# Patient Record
Sex: MALE | Race: BLACK OR AFRICAN AMERICAN | Employment: FULL TIME | URBAN - METROPOLITAN AREA
[De-identification: names, ages, dates, MRNs, and addresses within clinical notes are randomized per-mention and may not be internally consistent; named-entity substitution may affect disease eponyms.]

---

## 2020-03-16 ENCOUNTER — HOSPITAL ENCOUNTER (EMERGENCY)
Age: 35
Discharge: HOME | End: 2020-03-16
Attending: EMERGENCY MEDICINE
Payer: MEDICAID

## 2020-03-16 ENCOUNTER — APPOINTMENT (OUTPATIENT)
Dept: GENERAL RADIOLOGY | Age: 35
End: 2020-03-16
Attending: PHYSICIAN ASSISTANT
Payer: MEDICAID

## 2020-03-16 VITALS
RESPIRATION RATE: 16 BRPM | HEART RATE: 97 BPM | DIASTOLIC BLOOD PRESSURE: 87 MMHG | SYSTOLIC BLOOD PRESSURE: 128 MMHG | TEMPERATURE: 98.3 F | OXYGEN SATURATION: 99 % | BODY MASS INDEX: 29.12 KG/M2 | HEIGHT: 72 IN | WEIGHT: 215 LBS

## 2020-03-16 DIAGNOSIS — S16.1XXA STRAIN OF NECK MUSCLE, INITIAL ENCOUNTER: ICD-10-CM

## 2020-03-16 DIAGNOSIS — J20.9 ACUTE BRONCHITIS, UNSPECIFIED ORGANISM: Primary | ICD-10-CM

## 2020-03-16 PROCEDURE — 71046 X-RAY EXAM CHEST 2 VIEWS: CPT

## 2020-03-16 PROCEDURE — 99283 EMERGENCY DEPT VISIT LOW MDM: CPT

## 2020-03-16 RX ORDER — NAPROXEN 500 MG/1
500 TABLET ORAL
Qty: 20 TAB | Refills: 0 | Status: SHIPPED | OUTPATIENT
Start: 2020-03-16 | End: 2020-03-26

## 2020-03-16 RX ORDER — LORATADINE 10 MG/1
10 TABLET ORAL DAILY
Qty: 10 TAB | Refills: 0 | Status: SHIPPED | OUTPATIENT
Start: 2020-03-16 | End: 2022-01-27

## 2020-03-16 RX ORDER — BENZONATATE 100 MG/1
100 CAPSULE ORAL
Qty: 15 CAP | Refills: 0 | Status: SHIPPED | OUTPATIENT
Start: 2020-03-16 | End: 2020-03-21

## 2020-03-16 NOTE — ED TRIAGE NOTES
Pt reports to the ER w/ a dry cough x 1 week and muscular neck and chest pain starting yesterday. Pt reports wrestling w/ his bother last night.

## 2020-03-16 NOTE — DISCHARGE INSTRUCTIONS
Patient Education        Bronchitis: Care Instructions  Your Care Instructions    Bronchitis is inflammation of the bronchial tubes, which carry air to the lungs. The tubes swell and produce mucus, or phlegm. The mucus and inflamed bronchial tubes make you cough. You may have trouble breathing. Most cases of bronchitis are caused by viruses like those that cause colds. Antibiotics usually do not help and they may be harmful. Bronchitis usually develops rapidly and lasts about 2 to 3 weeks in otherwise healthy people. Follow-up care is a key part of your treatment and safety. Be sure to make and go to all appointments, and call your doctor if you are having problems. It's also a good idea to know your test results and keep a list of the medicines you take. How can you care for yourself at home? · Take all medicines exactly as prescribed. Call your doctor if you think you are having a problem with your medicine. · Get some extra rest.  · Take an over-the-counter pain medicine, such as acetaminophen (Tylenol), ibuprofen (Advil, Motrin), or naproxen (Aleve) to reduce fever and relieve body aches. Read and follow all instructions on the label. · Do not take two or more pain medicines at the same time unless the doctor told you to. Many pain medicines have acetaminophen, which is Tylenol. Too much acetaminophen (Tylenol) can be harmful. · Take an over-the-counter cough medicine that contains dextromethorphan to help quiet a dry, hacking cough so that you can sleep. Avoid cough medicines that have more than one active ingredient. Read and follow all instructions on the label. · Breathe moist air from a humidifier, hot shower, or sink filled with hot water. The heat and moisture will thin mucus so you can cough it out. · Do not smoke. Smoking can make bronchitis worse. If you need help quitting, talk to your doctor about stop-smoking programs and medicines.  These can increase your chances of quitting for good.  When should you call for help? Call 911 anytime you think you may need emergency care. For example, call if:    · You have severe trouble breathing.    Call your doctor now or seek immediate medical care if:    · You have new or worse trouble breathing.     · You cough up dark brown or bloody mucus (sputum).     · You have a new or higher fever.     · You have a new rash.    Watch closely for changes in your health, and be sure to contact your doctor if:    · You cough more deeply or more often, especially if you notice more mucus or a change in the color of your mucus.     · You are not getting better as expected. Where can you learn more? Go to http://josie-chris.info/  Enter H333 in the search box to learn more about \"Bronchitis: Care Instructions. \"  Current as of: June 9, 2019Content Version: 12.4  © 6549-0500 [employer]. Care instructions adapted under license by [employer] (which disclaims liability or warranty for this information). If you have questions about a medical condition or this instruction, always ask your healthcare professional. [address] any warranty or liability for your use of this information. Patient Education        Neck Strain: Care Instructions  Your Care Instructions    You have strained the muscles and ligaments in your neck. A sudden, awkward movement can strain the neck. This often occurs with falls or car accidents or during certain sports. Everyday activities like working on a computer or sleeping can also cause neck strain if they force you to hold your neck in an awkward position for a long time. It is common for neck pain to get worse for a day or two after an injury, but it should start to feel better after that. You may have more pain and stiffness for several days before it gets better. This is expected. It may take a few weeks or longer for it to heal completely.  Good home treatment can help you get better faster and avoid future neck problems. Follow-up care is a key part of your treatment and safety. Be sure to make and go to all appointments, and call your doctor if you are having problems. It's also a good idea to know your test results and keep a list of the medicines you take. How can you care for yourself at home? · If you were given a neck brace (cervical collar) to limit neck motion, wear it as instructed for as many days as your doctor tells you to. Do not wear it longer than you were told to. Wearing a brace for too long can make neck stiffness worse and weaken the neck muscles. · You can try using heat or ice to see if it helps. ? Try using a heating pad on a low or medium setting for 15 to 20 minutes every 2 to 3 hours. Try a warm shower in place of one session with the heating pad. You can also buy single-use heat wraps that last up to 8 hours. ? You can also try an ice pack for 10 to 15 minutes every 2 to 3 hours. · Take pain medicines exactly as directed. ? If the doctor gave you a prescription medicine for pain, take it as prescribed. ? If you are not taking a prescription pain medicine, ask your doctor if you can take an over-the-counter medicine. · Gently rub the area to relieve pain and help with blood flow. Do not massage the area if it hurts to do so. · Do not do anything that makes the pain worse. Take it easy for a couple of days. You can do your usual activities if they do not hurt your neck or put it at risk for more stress or injury. · Try sleeping on a special neck pillow. Place it under your neck, not under your head. Placing a tightly rolled-up towel under your neck while you sleep will also work. If you use a neck pillow or rolled towel, do not use your regular pillow at the same time. · To prevent future neck pain, do exercises to stretch and strengthen your neck and back.  Learn how to use good posture, safe lifting techniques, and proper body mechanics. When should you call for help? Call 911 anytime you think you may need emergency care. For example, call if:    · You are unable to move an arm or a leg at all.   [facility] your doctor now or seek immediate medical care if:    · You have new or worse symptoms in your arms, legs, chest, belly, or buttocks. Symptoms may include:  ? Numbness or tingling. ? Weakness. ? Pain.     · You lose bladder or bowel control.    Watch closely for changes in your health, and be sure to contact your doctor if:    · You are not getting better as expected. Where can you learn more? Go to http://josie-chris.info/  Enter M253 in the search box to learn more about \"Neck Strain: Care Instructions. \"  Current as of: June 26, 2019Content Version: 12.4  © 1001-0066 [employer]. Care instructions adapted under license by [employer] (which disclaims liability or warranty for this information). If you have questions about a medical condition or this instruction, always ask your healthcare professional. [address] any warranty or liability for your use of this information.

## 2020-03-16 NOTE — ED NOTES
Pt reports he is an everyday smoker and is c/o dry chronic cough he believes is from smoking, but is also c/o congestion, runny nose. Denies n/v but but does report diarrhea this morning. Denies any travel outside the country or being in contact with anyone who has been traveling. Also, c/o neck and chest pain d/t wrestling with his brother last night. Emergency Department Nursing Plan of Care       The Nursing Plan of Care is developed from the Nursing assessment and Emergency Department Attending provider initial evaluation. The plan of care may be reviewed in the ED Provider note.     The Plan of Care was developed with the following considerations:   Patient / Family readiness to learn indicated by:verbalized understanding  Persons(s) to be included in education: patient  Barriers to Learning/Limitations:No    Signed     Byron Rossi RN    3/16/2020   4:05 PM

## 2020-03-16 NOTE — ED PROVIDER NOTES
EMERGENCY DEPARTMENT HISTORY AND PHYSICAL EXAM    Date: 3/16/2020  Patient Name: Qian Ronquillo    History of Presenting Illness     Chief Complaint   Patient presents with    Muscle Pain    Cough         History Provided By: Patient    HPI: Qian Ronquillo is a 29 y.o. male with a PMH of anxiety and depression who presents with runny noses started today as well as chest pain. Patient states he coughs \"all the time. \"  But states that he got \"nervous\" because he started having nasal congestion today. Patient is a smoker. Patient also reports that he was wrestling with his brother last night so was having some left-sided neck pain with the chest pain. Patient denies any nausea, vomiting, fever or chills. Patient does report some intermittent shortness of breath. Patient rates pain 8 out of 10. Patient has not taken anything for symptoms prior to arrival.  Patient denies any recent travel or known sick contacts. PCP: Charlotte Gilliland MD        Past History     Past Medical History:  Past Medical History:   Diagnosis Date    Anxiety     Depression        Past Surgical History:  History reviewed. No pertinent surgical history. Family History:  History reviewed. No pertinent family history. Social History:  Social History     Tobacco Use    Smoking status: Current Every Day Smoker    Smokeless tobacco: Former User   Substance Use Topics    Alcohol use: Yes     Comment: occasional    Drug use: Not Currently       Allergies:  No Known Allergies      Review of Systems   Review of Systems   Constitutional: Negative for chills and fever. Respiratory: Positive for cough and shortness of breath. Negative for wheezing and stridor. Cardiovascular: Positive for chest pain. Gastrointestinal: Negative for abdominal pain, nausea and vomiting. Musculoskeletal: Positive for myalgias and neck pain. Allergic/Immunologic: Negative for immunocompromised state.    Neurological: Negative for speech difficulty and weakness. All other systems reviewed and are negative. Physical Exam     Vitals:    03/16/20 1557 03/16/20 1606 03/16/20 1655   BP: (!) 136/92  128/87   Pulse: (!) 105 97 97   Resp: 20  16   Temp: 98.3 °F (36.8 °C)     SpO2: 97% 98% 99%   Weight: 97.5 kg (215 lb)     Height: 6' (1.829 m)       Physical Exam  Vitals signs and nursing note reviewed. Constitutional:       General: He is not in acute distress. Appearance: He is well-developed. HENT:      Head: Normocephalic and atraumatic. Mouth/Throat:      Pharynx: No oropharyngeal exudate. Eyes:      Conjunctiva/sclera: Conjunctivae normal.   Neck:      Musculoskeletal: Muscular tenderness ( On left trapezius) present. No spinous process tenderness. Cardiovascular:      Rate and Rhythm: Normal rate and regular rhythm. Heart sounds: Normal heart sounds. Pulmonary:      Effort: Pulmonary effort is normal. No respiratory distress. Breath sounds: Normal breath sounds. No wheezing or rales. Chest:      Chest wall: Tenderness ( Along left upper chest wall) present. Musculoskeletal: Normal range of motion. Skin:     General: Skin is warm and dry. Neurological:      Mental Status: He is alert and oriented to person, place, and time. Diagnostic Study Results     Labs -   No results found for this or any previous visit (from the past 12 hour(s)). Radiologic Studies -   XR CHEST PA LAT   Final Result   IMPRESSION:   1. No radiographic evidence of acute cardiopulmonary disease. CT Results  (Last 48 hours)    None        CXR Results  (Last 48 hours)               03/16/20 1638  XR CHEST PA LAT Final result    Impression:  IMPRESSION:   1. No radiographic evidence of acute cardiopulmonary disease. Narrative:  INDICATION: . cough and SOB   Additional history: Cough and dyspnea   COMPARISON: None. [place] FINDINGS: PA and lateral view of the chest.    .   Lines/tubes/surgical: None. Heart/mediastinum: Unremarkable. Lungs/pleura:  No focal consolidation or mass. No visualized pleural effusion or   pneumothorax. Additional Comments: Dextroscoliosis of the thoracic spine. .               Medical Decision Making   I am the first provider for this patient. I reviewed the vital signs, available nursing notes, past medical history, past surgical history, family history and social history. Vital Signs-Reviewed the patient's vital signs. Disposition:  Discharged    DISCHARGE NOTE:   40 9 PM      Care plan outlined and precautions discussed. Patient has no new complaints, changes, or physical findings. Results of chest x-ray were reviewed with the patient. All medications were reviewed with the patient; will d/c home. All of pt's questions and concerns were addressed. Patient was instructed and agrees to follow up with PCP as needed, as well as to return to the ED upon further deterioration. Patient is ready to go home. Follow-up Information     Follow up With Specialties Details Why Contact Info    Gal Perez MD Family Practice Schedule an appointment as soon as possible for a visit in 1 week As needed Natalie Taylor [address].  [phone]            Discharge Medication List as of 3/16/2020  4:49 PM      START taking these medications    Details   benzonatate (Tessalon Perles) 100 mg capsule Take 1 Cap by mouth three (3) times daily as needed for Cough for up to 5 days. , Normal, Disp-15 Cap, R-0      loratadine (Claritin) 10 mg tablet Take 1 Tab by mouth daily. , Normal, Disp-10 Tab, R-0      naproxen (Naprosyn) 500 mg tablet Take 1 Tab by mouth two (2) times daily as needed for Pain for up to 10 days. , Normal, Disp-20 Tab, R-0             Provider Notes (Medical Decision Making):   DDX: URI, bronchitis, musculoskeletal chest wall pain, muscle strain, muscle spasm.   Will get chest x-ray in light of patient having chest pain with shortness of breath and cough but symptoms likely related to muscle strain from horseplay the night before. Procedures:  Procedures    Please note that this dictation was completed with Dragon, computer voice recognition software. Quite often unanticipated grammatical, syntax, homophones, and other interpretive errors are inadvertently transcribed by the computer software. Please disregard these errors. Additionally, please excuse any errors that have escaped final proofreading. Diagnosis     Clinical Impression:   1. Acute bronchitis, unspecified organism    2.  Strain of neck muscle, initial encounter

## 2020-03-16 NOTE — LETTER
[facility] EMERGENCY DEPT 
[address] [address] 
[phone] Work/School Note Date: 3/16/2020 To Whom It May concern: 
 
Aracely Paulino was seen and treated today in the emergency room by the following provider(s): 
Attending Provider: Nathalie Duran MD 
Physician Assistant: Melani Godfrey PA-C. Aracely Paulino may return to work on 3/18/20. Sincerely, Lyubov Marroquin PA-C

## 2020-03-17 ENCOUNTER — PATIENT OUTREACH (OUTPATIENT)
Dept: CASE MANAGEMENT | Age: 35
End: 2020-03-17

## 2020-03-17 NOTE — PROGRESS NOTES
This writer attempts to reach pt for ED discharge assessment. Today's call goes unanswered, unable to leave a message as mailbox is full. This writer will attempt again within 24 hrs.

## 2020-03-19 ENCOUNTER — PATIENT OUTREACH (OUTPATIENT)
Dept: CASE MANAGEMENT | Age: 35
End: 2020-03-19

## 2020-03-20 ENCOUNTER — PATIENT OUTREACH (OUTPATIENT)
Dept: CASE MANAGEMENT | Age: 35
End: 2020-03-20

## 2020-03-21 ENCOUNTER — HOSPITAL ENCOUNTER (EMERGENCY)
Age: 35
Discharge: HOME | End: 2020-03-21
Attending: EMERGENCY MEDICINE
Payer: MEDICAID

## 2020-03-21 VITALS
WEIGHT: 211 LBS | TEMPERATURE: 98.6 F | HEART RATE: 106 BPM | BODY MASS INDEX: 28.58 KG/M2 | SYSTOLIC BLOOD PRESSURE: 128 MMHG | OXYGEN SATURATION: 99 % | DIASTOLIC BLOOD PRESSURE: 77 MMHG | HEIGHT: 72 IN | RESPIRATION RATE: 16 BRPM

## 2020-03-21 DIAGNOSIS — Z71.1 CONCERN ABOUT SEXUALLY TRANSMITTED DISEASE IN MALE WITHOUT DIAGNOSIS: Primary | ICD-10-CM

## 2020-03-21 LAB
APPEARANCE UR: CLEAR
BACTERIA URNS QL MICRO: NEGATIVE /HPF
BILIRUB UR QL: NEGATIVE
COLOR UR: ABNORMAL
EPITH CASTS URNS QL MICRO: ABNORMAL /LPF
GLUCOSE UR STRIP.AUTO-MCNC: NEGATIVE MG/DL
HGB UR QL STRIP: NEGATIVE
KETONES UR QL STRIP.AUTO: NEGATIVE MG/DL
LEUKOCYTE ESTERASE UR QL STRIP.AUTO: ABNORMAL
NITRITE UR QL STRIP.AUTO: NEGATIVE
PH UR STRIP: 6.5 [PH] (ref 5–8)
PROT UR STRIP-MCNC: NEGATIVE MG/DL
RBC #/AREA URNS HPF: ABNORMAL /HPF (ref 0–5)
SP GR UR REFRACTOMETRY: 1.02 (ref 1–1.03)
UA: UC IF INDICATED,UAUC: ABNORMAL
UROBILINOGEN UR QL STRIP.AUTO: 0.2 EU/DL (ref 0.2–1)
WBC URNS QL MICRO: ABNORMAL /HPF (ref 0–4)

## 2020-03-21 PROCEDURE — 87491 CHLMYD TRACH DNA AMP PROBE: CPT

## 2020-03-21 PROCEDURE — 81001 URINALYSIS AUTO W/SCOPE: CPT

## 2020-03-21 PROCEDURE — 74011000250 HC RX REV CODE- 250: Performed by: NURSE PRACTITIONER

## 2020-03-21 PROCEDURE — 74011250636 HC RX REV CODE- 250/636: Performed by: NURSE PRACTITIONER

## 2020-03-21 PROCEDURE — 96372 THER/PROPH/DIAG INJ SC/IM: CPT

## 2020-03-21 PROCEDURE — 99283 EMERGENCY DEPT VISIT LOW MDM: CPT

## 2020-03-21 PROCEDURE — 74011250637 HC RX REV CODE- 250/637: Performed by: NURSE PRACTITIONER

## 2020-03-21 PROCEDURE — 87086 URINE CULTURE/COLONY COUNT: CPT

## 2020-03-21 RX ORDER — AZITHROMYCIN 500 MG/1
1000 TABLET, FILM COATED ORAL
Status: COMPLETED | OUTPATIENT
Start: 2020-03-21 | End: 2020-03-21

## 2020-03-21 RX ADMIN — AZITHROMYCIN 1000 MG: 500 TABLET, FILM COATED ORAL at 14:44

## 2020-03-21 RX ADMIN — LIDOCAINE HYDROCHLORIDE 250 MG: 10 INJECTION, SOLUTION EPIDURAL; INFILTRATION; INTRACAUDAL; PERINEURAL at 14:45

## 2020-03-21 NOTE — ED NOTES
Patient presents to ED with c/o std check. Patient is alert and oriented x 4 and in no acute distress at this time. Respirations are at a regular rate, depth, and pattern. Patient updated on plan of care and has no questions or concerns at this time. Call bell within reach. Will continue to monitor. Please reference nursing assessment. Emergency Department Nursing Plan of Care       The Nursing Plan of Care is developed from the Nursing assessment and Emergency Department Attending provider initial evaluation. The plan of care may be reviewed in the ED Provider note.     The Plan of Care was developed with the following considerations:   Patient / Family readiness to learn indicated by:verbalized understanding and successful return demonstration  Persons(s) to be included in education: patient  Barriers to Learning/Limitations:No    Signed     Celina Díaz RN    3/21/2020   2:14 PM

## 2020-03-21 NOTE — DISCHARGE INSTRUCTIONS
Patient Education        Exposure to Sexually Transmitted Infections: Care Instructions  Your Care Instructions  Sexually transmitted infections (STIs) are those diseases spread by sexual contact. There are at least 20 different STIs, including chlamydia, gonorrhea, syphilis, and human immunodeficiency virus (HIV), which causes AIDS. Bacteria-caused STIs can be treated and cured. STIs caused by viruses, such as HIV, can be treated but not cured. Some STIs can reduce a woman's chances of getting pregnant in the future. STIs are spread during sexual contact, such as vaginal intercourse and oral or anal sex. Follow-up care is a key part of your treatment and safety. Be sure to make and go to all appointments, and call your doctor if you are having problems. It's also a good idea to know your test results and keep a list of the medicines you take. How can you care for yourself at home? · Your doctor may have given you a shot of antibiotics. If your doctor prescribed antibiotic pills, take them as directed. Do not stop taking them just because you feel better. You need to take the full course of antibiotics. · Do not have sexual contact while you have symptoms of an STI or are being treated for an STI. · Tell your sex partner (or partners) that he or she will need treatment. · If you are a woman, do not douche. Douching changes the normal balance of bacteria in the vagina and may spread an infection up into your reproductive organs. To prevent exposure to STIs in the future  · Use latex condoms every time you have sex. Use them from the beginning to the end of sexual contact. · Talk to your partner before you have sex. Find out if he or she has or is at risk for any STI. Keep in mind that a person may be able to spread an STI even if he or she does not have symptoms. · Do not have sex if you are being treated for an STI.   · Do not have sex with anyone who has symptoms of an STI, such as sores on the genitals or mouth.  · Having one sex partner (who does not have STIs and does not have sex with anyone else) is a good way to avoid STIs. When should you call for help? Call your doctor now or seek immediate medical care if:    · You have new pain in your belly or pelvis.     · You have symptoms of a urinary tract infection. These may include:  ? Pain or burning when you urinate. ? A frequent need to urinate without being able to pass much urine. ? Pain in the flank, which is just below the rib cage and above the waist on either side of the back. ? Blood in your urine. ? A fever.     · You have new or worsening pain or swelling in the scrotum.    Watch closely for changes in your health, and be sure to contact your doctor if:    · You have unusual vaginal bleeding.     · You have a discharge from the vagina or penis.     · You have any new symptoms, such as sores, bumps, rashes, blisters, or warts.     · You have itching, tingling, pain, or burning in the genital or anal area.     · You think you may have an STI. Where can you learn more? Go to http://josie-chris.info/  Enter M049 in the search box to learn more about \"Exposure to Sexually Transmitted Infections: Care Instructions. \"  Current as of: July 7, 2019Content Version: 12.4  © 1733-6806 [employer]. Care instructions adapted under license by [employer] (which disclaims liability or warranty for this information). If you have questions about a medical condition or this instruction, always ask your healthcare professional. [place] [address] any warranty or liability for your use of this information.

## 2020-03-21 NOTE — ED PROVIDER NOTES
EMERGENCY DEPARTMENT HISTORY AND PHYSICAL EXAM    Date: 3/21/2020  Patient Name: Kirk Santiago    History of Presenting Illness     Chief Complaint   Patient presents with    Penile Discharge         History Provided By: Patient    Chief Complaint: penile burining  Duration: 1 Weeks  Timing:  Acute  Location: penis  Quality: Burning  Severity: Moderate  Modifying Factors: none  Associated Symptoms: denies any other associated signs or symptoms      HPI: Kirk Santiago is a 29 y.o. male with a PMH of No significant past medical history who presents with penile burning cute onset 1 week ago. Patient states he is sexually active. Patient states he has concern for sexually transmitted infection. Patient specifically denies penile discharge genital lesions dysuria hematuria fever abdominal pain nausea vomiting. He has no other complaints at this time. PCP: Malorie Luke MD    Current Outpatient Medications   Medication Sig Dispense Refill    benzonatate (Tessalon Perles) 100 mg capsule Take 1 Cap by mouth three (3) times daily as needed for Cough for up to 5 days. 15 Cap 0    loratadine (Claritin) 10 mg tablet Take 1 Tab by mouth daily. 10 Tab 0    naproxen (Naprosyn) 500 mg tablet Take 1 Tab by mouth two (2) times daily as needed for Pain for up to 10 days. 20 Tab 0       Past History     Past Medical History:  Past Medical History:   Diagnosis Date    Anxiety     Depression        Past Surgical History:  History reviewed. No pertinent surgical history. Family History:  History reviewed. No pertinent family history. Social History:  Social History     Tobacco Use    Smoking status: Current Every Day Smoker    Smokeless tobacco: Former User   Substance Use Topics    Alcohol use: Yes     Comment: occasional    Drug use: Not Currently       Allergies:  No Known Allergies      Review of Systems   Review of Systems   Constitutional: Negative for chills, fatigue and fever.    HENT: Negative for congestion and sore throat. Eyes: Negative for redness. Respiratory: Negative for cough, chest tightness and wheezing. Cardiovascular: Negative for chest pain. Gastrointestinal: Negative for abdominal pain. Genitourinary: Negative for dysuria. Penile burining   Musculoskeletal: Negative for arthralgias, back pain, myalgias, neck pain and neck stiffness. Skin: Negative for rash. Neurological: Negative for dizziness, syncope, weakness, light-headedness, numbness and headaches. Hematological: Negative for adenopathy. All other systems reviewed and are negative. Physical Exam     Vitals:    03/21/20 1348   BP: 128/77   Pulse: (!) 106   Resp: 16   Temp: 98.6 °F (37 °C)   SpO2: 99%   Weight: 95.7 kg (211 lb)   Height: 6' (1.829 m)     Physical Exam  Vitals signs and nursing note reviewed. Constitutional:       Appearance: He is well-developed. HENT:      Head: Normocephalic and atraumatic. Right Ear: External ear normal.   Eyes:      General:         Right eye: No discharge. Left eye: No discharge. Conjunctiva/sclera: Conjunctivae normal.   Neck:      Musculoskeletal: Normal range of motion and neck supple. Cardiovascular:      Rate and Rhythm: Normal rate and regular rhythm. Heart sounds: Normal heart sounds. Pulmonary:      Effort: Pulmonary effort is normal. No respiratory distress. Breath sounds: Normal breath sounds. No wheezing. Abdominal:      General: Bowel sounds are normal.      Palpations: Abdomen is soft. Tenderness: There is no abdominal tenderness. Musculoskeletal: Normal range of motion. Lymphadenopathy:      Cervical: No cervical adenopathy. Skin:     General: Skin is warm and dry. Neurological:      Mental Status: He is alert and oriented to person, place, and time. Cranial Nerves: No cranial nerve deficit. Psychiatric:         Behavior: Behavior normal.         Thought Content:  Thought content normal. Judgment: Judgment normal.           Diagnostic Study Results     Labs -     Recent Results (from the past 12 hour(s))   URINALYSIS W/ REFLEX CULTURE    Collection Time: 03/21/20  2:05 PM   Result Value Ref Range    Color YELLOW/STRAW      Appearance CLEAR CLEAR      Specific gravity 1.020 1.003 - 1.030      pH (UA) 6.5 5.0 - 8.0      Protein NEGATIVE  NEG mg/dL    Glucose NEGATIVE  NEG mg/dL    Ketone NEGATIVE  NEG mg/dL    Bilirubin NEGATIVE  NEG      Blood NEGATIVE  NEG      Urobilinogen 0.2 0.2 - 1.0 EU/dL    Nitrites NEGATIVE  NEG      Leukocyte Esterase SMALL (A) NEG      WBC 10-20 0 - 4 /hpf    RBC 0-5 0 - 5 /hpf    Epithelial cells FEW FEW /lpf    Bacteria NEGATIVE  NEG /hpf    UA:UC IF INDICATED URINE CULTURE ORDERED (A) CNI         Radiologic Studies -   No orders to display     CT Results  (Last 48 hours)    None        CXR Results  (Last 48 hours)    None            Medical Decision Making   I am the first provider for this patient. I reviewed the vital signs, available nursing notes, past medical history, past surgical history, family history and social history. Vital Signs-Reviewed the patient's vital signs. Records Reviewed: Nursing Notes            Disposition:  home    DISCHARGE NOTE:           Care plan outlined and precautions discussed. Patient has no new complaints, changes, or physical findings. Results of tests were reviewed with the patient. All medications were reviewed with the patient; will d/c home . All of pt's questions and concerns were addressed. Patient was instructed and agrees to follow up with PCP, as well as to return to the ED upon further deterioration. Patient is ready to go home.     Follow-up Information     Follow up With Specialties Details Why Contact Info    Abad Dupree MD [facility] In 1 week  [address] [facility] [address]  [phone]            Current Discharge Medication List          Provider Notes (Medical Decision Making):   DDX LANA UTI STD  Procedures:  Procedures    Please note that this dictation was completed with Dragon, computer voice recognition software. Quite often unanticipated grammatical, syntax, homophones, and other interpretive errors are inadvertently transcribed by the computer software. Please disregard these errors. Additionally, please excuse any errors that have escaped final proofreading. Diagnosis     Clinical Impression:   1.  Concern about sexually transmitted disease in male without diagnosis

## 2020-03-22 LAB
BACTERIA SPEC CULT: NORMAL
SERVICE CMNT-IMP: NORMAL

## 2020-03-23 LAB
C TRACH DNA SPEC QL NAA+PROBE: NEGATIVE
N GONORRHOEA DNA SPEC QL NAA+PROBE: NEGATIVE
SAMPLE TYPE: NORMAL
SERVICE CMNT-IMP: NORMAL
SPECIMEN SOURCE: NORMAL

## 2020-04-30 ENCOUNTER — HOSPITAL ENCOUNTER (EMERGENCY)
Age: 35
Discharge: HOME | End: 2020-04-30
Attending: EMERGENCY MEDICINE
Payer: MEDICAID

## 2020-04-30 VITALS
WEIGHT: 220 LBS | HEART RATE: 113 BPM | SYSTOLIC BLOOD PRESSURE: 150 MMHG | TEMPERATURE: 97.9 F | BODY MASS INDEX: 29.8 KG/M2 | RESPIRATION RATE: 22 BRPM | HEIGHT: 72 IN | OXYGEN SATURATION: 96 % | DIASTOLIC BLOOD PRESSURE: 82 MMHG

## 2020-04-30 DIAGNOSIS — H10.33 ACUTE BACTERIAL CONJUNCTIVITIS OF BOTH EYES: ICD-10-CM

## 2020-04-30 DIAGNOSIS — R30.0 DYSURIA: Primary | ICD-10-CM

## 2020-04-30 LAB
APPEARANCE UR: CLEAR
BACTERIA URNS QL MICRO: NEGATIVE /HPF
BILIRUB UR QL: NEGATIVE
COLOR UR: NORMAL
EPITH CASTS URNS QL MICRO: NORMAL /LPF
GLUCOSE UR STRIP.AUTO-MCNC: NEGATIVE MG/DL
HGB UR QL STRIP: NEGATIVE
KETONES UR QL STRIP.AUTO: NEGATIVE MG/DL
LEUKOCYTE ESTERASE UR QL STRIP.AUTO: NEGATIVE
NITRITE UR QL STRIP.AUTO: NEGATIVE
PH UR STRIP: 6 [PH] (ref 5–8)
PROT UR STRIP-MCNC: NEGATIVE MG/DL
RBC #/AREA URNS HPF: NORMAL /HPF (ref 0–5)
SP GR UR REFRACTOMETRY: <1.005 (ref 1–1.03)
UA: UC IF INDICATED,UAUC: NORMAL
UROBILINOGEN UR QL STRIP.AUTO: 0.2 EU/DL (ref 0.2–1)
WBC URNS QL MICRO: NORMAL /HPF (ref 0–4)

## 2020-04-30 PROCEDURE — 99283 EMERGENCY DEPT VISIT LOW MDM: CPT

## 2020-04-30 PROCEDURE — 87491 CHLMYD TRACH DNA AMP PROBE: CPT

## 2020-04-30 PROCEDURE — 81001 URINALYSIS AUTO W/SCOPE: CPT

## 2020-04-30 RX ORDER — POLYMYXIN B SULFATE AND TRIMETHOPRIM 1; 10000 MG/ML; [USP'U]/ML
1 SOLUTION OPHTHALMIC EVERY 4 HOURS
Qty: 10 ML | Refills: 0 | Status: SHIPPED | OUTPATIENT
Start: 2020-04-30 | End: 2020-04-30

## 2020-04-30 RX ORDER — POLYMYXIN B SULFATE AND TRIMETHOPRIM 1; 10000 MG/ML; [USP'U]/ML
1 SOLUTION OPHTHALMIC EVERY 4 HOURS
Qty: 10 ML | Refills: 0 | Status: SHIPPED | OUTPATIENT
Start: 2020-04-30 | End: 2022-01-27

## 2020-05-01 ENCOUNTER — PATIENT OUTREACH (OUTPATIENT)
Dept: FAMILY MEDICINE CLINIC | Age: 35
End: 2020-05-01

## 2020-05-01 NOTE — ED NOTES
Patient  given copy of dc instructions and 1 script(s). Patient verbalized understanding of instructions and script (s). Patient given a current medication reconciliation form and verbalized understanding of their medications. Patient verbalized understanding of the importance of discussing medications with  his or her physician or clinic they will be following up with. Patient alert and oriented and in no acute distress. Patient discharged home ambulatory.

## 2020-05-01 NOTE — ED TRIAGE NOTES
Pt reports bilateral eye drainage, crusting, pain, & itching, and penis pain X 3 days. Pt reports friend was diagnosed w/ pink eye. Pt denies concern for STD due to recent check for STDs.

## 2020-05-01 NOTE — ED NOTES
Pt arrived to ED via car with c/o eye pain and pain with urination x 2-3 days. Pt is in no acute distress. Will continue to monitor. See nursing assessment. Safety precautions in place; call light within reach. Emergency Department Nursing Plan of Care       The Nursing Plan of Care is developed from the Nursing assessment and Emergency Department Attending provider initial evaluation. The plan of care may be reviewed in the ED Provider note.     The Plan of Care was developed with the following considerations:   Patient / Family readiness to learn indicated by:verbalized understanding  Persons(s) to be included in education: patient  Barriers to Learning/Limitations:No    Signed     Neto Part, RN    4/30/2020   10:47 PM

## 2020-05-01 NOTE — PROGRESS NOTES
Patient contacted regarding recent discharge and COVID-19 risk   Care Transition Nurse/ Ambulatory Care Manager contacted the patient by telephone to perform post discharge assessment. Verified name and  with patient as identifiers. Patient has following risk factors of: no known risk factors. CTN/ACM reviewed discharge instructions, medical action plan and red flags related to discharge diagnosis. Reviewed and educated them on any new and changed medications related to discharge diagnosis. Advised obtaining a 90-day supply of all daily and as-needed medications. Education provided regarding infection prevention, and signs and symptoms of COVID-19 and when to seek medical attention with patient who verbalized understanding. Discussed exposure protocols and quarantine from [address] you at higher risk for severe illness  and given an opportunity for questions and concerns. The patient agrees to contact the COVID-19 hotline [phone] or PCP office for questions related to their healthcare. CTN/ACM provided contact information for future reference. From CDC: Are you at higher risk for severe illness?  Wash your hands often.  Avoid close contact (6 feet, which is about two arm lengths) with people who are sick.  Put distance between yourself and other people if COVID-19 is spreading in your community.  Clean and disinfect frequently touched surfaces.  Avoid all cruise travel and non-essential air travel.  Call your healthcare professional if you have concerns about COVID-19 and your underlying condition or if you are sick.     For more information on steps you can take to protect yourself, see CDC's How to Protect Yourself      Patient/family/caregiver given information for [facility] and agrees to enroll no  Patient's preferred e-mail:  n/a  Patient's preferred phone number: n/a  Based on Loop alert triggers, patient will be contacted by nurse care manager for worsening symptoms. Plan for follow-up call in 7-14 days based on severity of symptoms and risk factors.

## 2020-05-01 NOTE — ED PROVIDER NOTES
60-year-old male presents with chief complaints of bilateral eye redness, \"crusting,\" \"watering,\" and itching for 3 days. States at times it is affecting his vision. Does not wear glasses or contacts. States that the day before his symptoms began he was with a friend who was diagnosed with pinkeye. He also tells me that he has been experiencing discomfort with urination for 4 days. He denies penile discharge, genital lesions. He denies muscle pain, nausea, vomiting, fever. He states he was seen here previously for this and tested negative for gonorrhea chlamydia, so he does not think it is an STD. Past Medical History:   Diagnosis Date    Anxiety     Depression        No past surgical history on file. No family history on file.     Social History     Socioeconomic History    Marital status: LEGALLY      Spouse name: Not on file    Number of children: Not on file    Years of education: Not on file    Highest education level: Not on file   Occupational History    Not on file   Social Needs    Financial resource strain: Not on file    Food insecurity     Worry: Not on file     Inability: Not on file    Transportation needs     Medical: Not on file     Non-medical: Not on file   Tobacco Use    Smoking status: Current Every Day Smoker    Smokeless tobacco: Former User   Substance and Sexual Activity    Alcohol use: Yes     Comment: occasional    Drug use: Not Currently    Sexual activity: Not on file   Lifestyle    Physical activity     Days per week: Not on file     Minutes per session: Not on file    Stress: Not on file   Relationships    Social connections     Talks on phone: Not on file     Gets together: Not on file     Attends Nondenominational service: Not on file     Active member of club or organization: Not on file     Attends meetings of clubs or organizations: Not on file     Relationship status: Not on file    Intimate partner violence     Fear of current or ex partner: Not on file     Emotionally abused: Not on file     Physically abused: Not on file     Forced sexual activity: Not on file   Other Topics Concern    Not on file   Social History Narrative    Not on file         ALLERGIES: Patient has no known allergies. Review of Systems   Constitutional: Negative. Negative for fever. HENT: Negative. Negative for drooling, facial swelling and trouble swallowing. Eyes: Positive for discharge, redness and itching. Respiratory: Negative. Negative for chest tightness, shortness of breath and wheezing. Cardiovascular: Negative. Negative for chest pain. Gastrointestinal: Negative. Negative for abdominal distention, abdominal pain, constipation, diarrhea, nausea and vomiting. Endocrine: Negative. Genitourinary: Positive for dysuria. Negative for difficulty urinating. Musculoskeletal: Negative. Negative for arthralgias and myalgias. Skin: Negative. Negative for color change and rash. Allergic/Immunologic: Negative. Neurological: Negative. Negative for syncope, facial asymmetry and speech difficulty. Hematological: Negative. Psychiatric/Behavioral: Negative. Negative for agitation and confusion. All other systems reviewed and are negative. Vitals:    04/30/20 2129   BP: 150/82   Pulse: (!) 113   Resp: 22   Temp: 97.9 °F (36.6 °C)   SpO2: 96%   Weight: 99.8 kg (220 lb)   Height: 6' (1.829 m)            Physical Exam  Vitals signs and nursing note reviewed. Constitutional:       Appearance: Normal appearance. He is well-developed. HENT:      Head: Normocephalic and atraumatic. Eyes:      General: Lids are normal.         Right eye: Discharge present. Left eye: Discharge present. Extraocular Movements:      Right eye: Normal extraocular motion. Left eye: Normal extraocular motion. Conjunctiva/sclera:      Right eye: Right conjunctiva is injected. Left eye: Left conjunctiva is injected.    Neck: Musculoskeletal: Neck supple. Cardiovascular:      Rate and Rhythm: Normal rate and regular rhythm. Pulmonary:      Effort: No accessory muscle usage or respiratory distress. Abdominal:      Palpations: Abdomen is soft. Tenderness: There is no abdominal tenderness. Musculoskeletal: Normal range of motion. Skin:     General: Skin is warm and dry. Neurological:      Mental Status: He is alert and oriented to person, place, and time. Psychiatric:         Behavior: Behavior normal.         Thought Content: Thought content normal.          MDM  Number of Diagnoses or Management Options  Acute bacterial conjunctivitis of both eyes:   Dysuria:   Diagnosis management comments: Given his known contact with patient with david, eye symptoms are likely conjunctivitis. May require treatment for urethritis with outpatient doxycycline instead of just one-time dose of ceftriaxone in ED azithromycin as he got on his last visit. I counseled him that I pain and urinary pain could be indicative of arthritic diseases, though this is less likely because of his known pink eye contact.   Counseled him to follow-up with primary care if symptoms persist         Procedures      LABORATORY TESTS:  Recent Results (from the past 12 hour(s))   URINALYSIS W/ REFLEX CULTURE    Collection Time: 04/30/20  9:56 PM   Result Value Ref Range    Color YELLOW/STRAW      Appearance CLEAR CLEAR      Specific gravity <1.005 1.003 - 1.030    pH (UA) 6.0 5.0 - 8.0      Protein Negative NEG mg/dL    Glucose Negative NEG mg/dL    Ketone Negative NEG mg/dL    Bilirubin Negative NEG      Blood Negative NEG      Urobilinogen 0.2 0.2 - 1.0 EU/dL    Nitrites Negative NEG      Leukocyte Esterase Negative NEG      WBC 0-4 0 - 4 /hpf    RBC 0-5 0 - 5 /hpf    Epithelial cells FEW FEW /lpf    Bacteria Negative NEG /hpf    UA:UC IF INDICATED CULTURE NOT INDICATED BY UA RESULT CNI         IMAGING RESULTS:  No orders to display       MEDICATIONS GIVEN:  Medications - No data to display    IMPRESSION:  1. Dysuria    2. Acute bacterial conjunctivitis of both eyes        PLAN:  1. Discharge Medication List as of 4/30/2020 10:31 PM      START taking these medications    Details   trimethoprim-polymyxin b (POLYTRIM) ophthalmic solution Administer 1 Drop to both eyes every four (4) hours. Indications: pink eye from bacterial infection, Print, Disp-10 mL, R-0         CONTINUE these medications which have NOT CHANGED    Details   loratadine (Claritin) 10 mg tablet Take 1 Tab by mouth daily. , Normal, Disp-10 Tab, R-0           2.    Follow-up Information    None       Return to ED if worse

## 2020-05-01 NOTE — DISCHARGE INSTRUCTIONS
Patient Education        Patient Education        Pinkeye: Care Instructions  Your Care Instructions    Pinkeye is redness and swelling of the eye surface and the conjunctiva (the lining of the eyelid and the covering of the white part of the eye). Pinkeye is also called conjunctivitis. Pinkeye is often caused by infection with bacteria or a virus. Dry air, allergies, smoke, and chemicals are other common causes. Pinkeye often clears on its own in 7 to 10 days. Antibiotics only help if the pinkeye is caused by bacteria. Pinkeye caused by infection spreads easily. If an allergy or chemical is causing pinkeye, it will not go away unless you can avoid whatever is causing it. Follow-up care is a key part of your treatment and safety. Be sure to make and go to all appointments, and call your doctor if you are having problems. It's also a good idea to know your test results and keep a list of the medicines you take. How can you care for yourself at home? · Wash your hands often. Always wash them before and after you treat pinkeye or touch your eyes or face. · Use moist cotton or a clean, wet cloth to remove crust. Wipe from the inside corner of the eye to the outside. Use a clean part of the cloth for each wipe. · Put cold or warm wet cloths on your eye a few times a day if the eye hurts. · Do not wear contact lenses or eye makeup until the pinkeye is gone. Throw away any eye makeup you were using when you got pinkeye. Clean your contacts and storage case. If you wear disposable contacts, use a new pair when your eye has cleared and it is safe to wear contacts again. · If the doctor gave you antibiotic ointment or eyedrops, use them as directed. Use the medicine for as long as instructed, even if your eye starts looking better soon. Keep the bottle tip clean, and do not let it touch the eye area. · To put in eyedrops or ointment:  ? Tilt your head back, and pull your lower eyelid down with one finger. ?  Drop or squirt the medicine inside the lower lid. ? Close your eye for 30 to 60 seconds to let the drops or ointment move around. ? Do not touch the ointment or dropper tip to your eyelashes or any other surface. · Do not share towels, pillows, or washcloths while you have pinkeye. When should you call for help? Call your doctor now or seek immediate medical care if:    · You have pain in your eye, not just irritation on the surface.     · You have a change in vision or loss of vision.     · You have an increase in discharge from the eye.     · Your eye has not started to improve or begins to get worse within 48 hours after you start using antibiotics.     · Pinkeye lasts longer than 7 days.    Watch closely for changes in your health, and be sure to contact your doctor if you have any problems. Where can you learn more? Go to http://josie-chris.info/  Enter Y392 in the search box to learn more about \"Pinkeye: Care Instructions. \"  Current as of: June 26, 2019Content Version: 12.4  © 9557-6256 [employer]. Care instructions adapted under license by [employer] (which disclaims liability or warranty for this information). If you have questions about a medical condition or this instruction, always ask your healthcare professional. [place] [address] any warranty or liability for your use of this information. Painful Urination (Dysuria): Care Instructions  Your Care Instructions  Burning pain with urination (dysuria) is a common symptom of a urinary tract infection or other urinary problems. The bladder may become inflamed. This can cause pain when the bladder fills and empties. You may also feel pain if the tube that carries urine from the bladder to the outside of the body (urethra) gets irritated or infected. Sexually transmitted infections (STIs) also may cause pain when you urinate.   Sometimes the pain can be caused by things other than an infection. The urethra can be irritated by soaps, perfumes, or foreign objects in the urethra. Kidney stones can cause pain when they pass through the urethra. The cause may be hard to find. You may need tests. Treatment for painful urination depends on the cause. Follow-up care is a key part of your treatment and safety. Be sure to make and go to all appointments, and call your doctor if you are having problems. It's also a good idea to know your test results and keep a list of the medicines you take. How can you care for yourself at home? · Drink extra water for the next day or two. This will help make the urine less concentrated. (If you have kidney, heart, or liver disease and have to limit fluids, talk with your doctor before you increase the amount of fluids you drink.)  · Avoid drinks that are carbonated or have caffeine. They can irritate the bladder. · Urinate often. Try to empty your bladder each time. For women:  · Urinate right after you have sex. · After going to the bathroom, wipe from front to back. · Avoid douches, bubble baths, and feminine hygiene sprays. And avoid other feminine hygiene products that have deodorants. When should you call for help? Call your doctor now or seek immediate medical care if:    · You have new symptoms, such as fever, nausea, or vomiting.     · You have new or worse symptoms of a urinary problem. For example:  ? You have blood or pus in your urine. ? You have chills or body aches. ? It hurts worse to urinate. ? You have groin or belly pain. ? You have pain in your back just below your rib cage (the flank area).    Watch closely for changes in your health, and be sure to contact your doctor if you have any problems. Where can you learn more? Go to http://josie-chris.info/  Enter H814 in the search box to learn more about \"Painful Urination (Dysuria): Care Instructions. \"  Current as of: August 21, 2019Content Version: 12.4  © 7768-2416 [employer]. Care instructions adapted under license by [employer] (which disclaims liability or warranty for this information). If you have questions about a medical condition or this instruction, always ask your healthcare professional. [address] any warranty or liability for your use of this information.

## 2020-05-20 ENCOUNTER — PATIENT OUTREACH (OUTPATIENT)
Dept: FAMILY MEDICINE CLINIC | Age: 35
End: 2020-05-20

## 2020-05-20 NOTE — PROGRESS NOTES
Patient resolved from Transition of Care episode on 5/20/20  Patient/family has been provided the following resources and education related to COVID-19:                         Signs, symptoms and red flags related to COVID-19            CDC exposure and quarantine guidelines            [facility]it exposure contact - [phone]            Contact for their local Department of Health                 Patient currently reports that the following symptoms have improved:  no new symptoms. No further outreach scheduled with this CTN/ACM. Episode of Care resolved. Patient has this CTN/ACM contact information if future needs arise.

## 2020-07-08 ENCOUNTER — VIRTUAL VISIT (OUTPATIENT)
Dept: FAMILY MEDICINE CLINIC | Age: 35
End: 2020-07-08

## 2020-07-08 DIAGNOSIS — E78.2 MIXED HYPERLIPIDEMIA: Primary | ICD-10-CM

## 2020-07-08 DIAGNOSIS — Z71.89 ADVICE GIVEN ABOUT COVID-19 VIRUS INFECTION: ICD-10-CM

## 2020-07-08 DIAGNOSIS — R53.83 FATIGUE DUE TO DEPRESSION: ICD-10-CM

## 2020-07-08 DIAGNOSIS — Z83.3 FAMILY HISTORY OF DIABETES MELLITUS (DM): ICD-10-CM

## 2020-07-08 DIAGNOSIS — F33.9 RECURRENT DEPRESSIVE DISORDER (HCC): ICD-10-CM

## 2020-07-08 DIAGNOSIS — F32.A FATIGUE DUE TO DEPRESSION: ICD-10-CM

## 2020-07-08 RX ORDER — HYDROXYZINE 50 MG/1
50 TABLET, FILM COATED ORAL DAILY
Qty: 30 TAB | Refills: 1 | Status: SHIPPED | OUTPATIENT
Start: 2020-07-08 | End: 2021-01-11 | Stop reason: SDUPTHER

## 2020-07-08 RX ORDER — SIMVASTATIN 20 MG/1
20 TABLET, FILM COATED ORAL
COMMUNITY
End: 2020-07-08 | Stop reason: SDUPTHER

## 2020-07-08 RX ORDER — SIMVASTATIN 20 MG/1
20 TABLET, FILM COATED ORAL
Qty: 30 TAB | Refills: 3 | Status: SHIPPED | OUTPATIENT
Start: 2020-07-08 | End: 2021-01-11 | Stop reason: SDUPTHER

## 2020-07-08 RX ORDER — HYDROXYZINE 50 MG/1
50 TABLET, FILM COATED ORAL DAILY
COMMUNITY
End: 2020-07-08 | Stop reason: SDUPTHER

## 2020-07-08 NOTE — PATIENT INSTRUCTIONS

## 2020-07-08 NOTE — PROGRESS NOTES
Chief Complaint   Patient presents with   [facility]     1. Have you been to the ER, urgent care clinic since your last visit? Hospitalized since your last visit? Yes When: 4/30/ Where: [facility] Reason for visit: eye drainage, cough    2. Have you seen or consulted any other health care providers outside of the [address] [place] [address] since your last visit? Include any pap smears or colon screening. No    Call placed to pt. Verified patient with two type of identifiers.  no previous PCP

## 2020-07-08 NOTE — PROGRESS NOTES
HISTORY OF PRESENT ILLNESS  Rob Donato is a 29 y.o. male. HPI  Today's Pt main concerns were provided on virtual visit and Video telemed format,  Present on VV for the concern of the current medical conditions,  pt is w/ comorbid history and unaware if the pt has been exposed to covid-19 individual,   Pt Have been staying at home for couple of wks,  pt has no fever no cough no dyspnea, no ha, not dizzy, nl smell nl taste, no N/V/D, no body ache. Unfortunately  with tobacco abuse stating that the pt is all stressed out, the tobacco use helps it, took meds for it, no s no h ideation, the pt has slowed down but not ready to quit at this time unable to afford the medication the pt likes to do it on its own, pt has been offered alternatives but states not yet, +socially Etoh  In addition patient has history of hypercholesterolemia has been compliant with the statin therapy denies any muscle ache, currently taking the medication nightly last lipid panel was reviewed and was normal ++++refills needed at this time has been trying to have a low-fat low-cholesterol diet sometimes likes the fast foods weight has been stable    Patient's current medical condition is not controlled with medications,   Patient state that it is getting better: pt states and reports of feeling anxius, some guilty feeling, no Hoplessness, patient at this time has ns/nh,ni,nh, and some trouble with weight gain, last ability of sleep, okay ablitiy  to concentrate at work(patient is able to work 8-12 hrs per week at this time) and at home with the current medications, and all together a safe feeling at home and at work since the age of 15 yo, was incarcerated, finished school while incarcerated, do not want to be medicated,          Current Outpatient Medications   Medication Sig Dispense Refill    hydrOXYzine HCL (ATARAX) 50 mg tablet Take 1 Tab by mouth daily.  Indications: anxious 30 Tab 1    simvastatin (ZOCOR) 20 mg tablet Take 1 Tab by mouth nightly. 30 Tab 3    trimethoprim-polymyxin b (POLYTRIM) ophthalmic solution Administer 1 Drop to both eyes every four (4) hours. Indications: pink eye from bacterial infection 10 mL 0    loratadine (Claritin) 10 mg tablet Take 1 Tab by mouth daily. 10 Tab 0     No Known Allergies  Past Medical History:   Diagnosis Date    Anxiety     Anxiety     Depression     Hypercholesterolemia      History reviewed. No pertinent surgical history. Family History   Problem Relation Age of Onset    Hypertension Mother     No Known Problems Father      Social History     Tobacco Use    Smoking status: Current Every Day Smoker     Packs/day: 0.50    Smokeless tobacco: Never Used   Substance Use Topics    Alcohol use: Yes     Alcohol/week: 3.0 standard drinks     Types: 3 Cans of beer per week      No results found for: HBA1C, [id number], HGBE8, GLU, GESTF, GLUCPOC, MCACR, MCA1, MCA2, MCA3, MCAU, LDL, LDLC, DLDLP, AJAY, CREAPOC, ACREA, CREA, REFC3, REFC4, ZEE0DXAN   No results found for: CHOL, CHOLPOCT, HDL, LDL, LDLC, LDLCPOC, LDLCEXT, TRIGL, TGLPOCT, CHHD, CHHDX     Review of Systems   Constitutional: Negative for chills and fever. HENT: Negative for congestion and nosebleeds. Eyes: Negative for blurred vision and pain. Respiratory: Negative for cough, shortness of breath and wheezing. Cardiovascular: Negative for chest pain and leg swelling. Gastrointestinal: Negative for constipation, diarrhea, nausea and vomiting. Genitourinary: Negative for dysuria and frequency. Musculoskeletal: Negative for joint pain and myalgias. Skin: Negative for itching and rash. Neurological: Negative for dizziness, loss of consciousness and headaches. Psychiatric/Behavioral: Negative for depression. The patient is not nervous/anxious and does not have insomnia. Physical Exam  Constitutional:       Appearance: Normal appearance. HENT:      Head: Normocephalic and atraumatic.       Nose: Nose normal. No congestion. Neurological:      Mental Status: He is alert and oriented to person, place, and time. Psychiatric:         Mood and Affect: Mood is anxious and depressed. Mood is not elated. Affect is not labile, blunt, flat, angry, tearful or inappropriate. Speech: He is communicative. Speech is not rapid and pressured or delayed. Behavior: Behavior is slowed. Behavior is not agitated, aggressive or withdrawn. Thought Content: Thought content normal. Thought content is not paranoid. Thought content does not include suicidal ideation. Cognition and Memory: Cognition and memory normal.         Judgment: Judgment normal. Judgment is not impulsive or inappropriate. ASSESSMENT and PLAN  Diagnoses and all orders for this visit:    1. Mixed hyperlipidemia  -     TSH 3RD GENERATION  -     METABOLIC PANEL, COMPREHENSIVE  -     LIPID PANEL  -     HEMOGLOBIN A1C WITH EAG  -     CBC WITH AUTOMATED DIFF  -     URINALYSIS W/ RFLX MICROSCOPIC  -     hydrOXYzine HCL (ATARAX) 50 mg tablet; Take 1 Tab by mouth daily. Indications: anxious  -     simvastatin (ZOCOR) 20 mg tablet; Take 1 Tab by mouth nightly. 2. Advice given about COVID-19 virus infection  -     TSH 3RD GENERATION  -     METABOLIC PANEL, COMPREHENSIVE  -     LIPID PANEL  -     HEMOGLOBIN A1C WITH EAG  -     CBC WITH AUTOMATED DIFF  -     URINALYSIS W/ RFLX MICROSCOPIC  -     hydrOXYzine HCL (ATARAX) 50 mg tablet; Take 1 Tab by mouth daily. Indications: anxious  -     simvastatin (ZOCOR) 20 mg tablet; Take 1 Tab by mouth nightly. 3. Fatigue due to depression  -     TSH 3RD GENERATION  -     METABOLIC PANEL, COMPREHENSIVE  -     LIPID PANEL  -     HEMOGLOBIN A1C WITH EAG  -     CBC WITH AUTOMATED DIFF  -     URINALYSIS W/ RFLX MICROSCOPIC  -     hydrOXYzine HCL (ATARAX) 50 mg tablet; Take 1 Tab by mouth daily. Indications: anxious  -     simvastatin (ZOCOR) 20 mg tablet; Take 1 Tab by mouth nightly.     4. Recurrent [facility] ([facility]  -     TSH 3RD GENERATION  -     METABOLIC PANEL, COMPREHENSIVE  -     LIPID PANEL  -     HEMOGLOBIN A1C WITH EAG  -     CBC WITH AUTOMATED DIFF  -     URINALYSIS W/ RFLX MICROSCOPIC  -     hydrOXYzine HCL (ATARAX) 50 mg tablet; Take 1 Tab by mouth daily. Indications: anxious  -     simvastatin (ZOCOR) 20 mg tablet; Take 1 Tab by mouth nightly. 5. Family history of diabetes mellitus (DM)  -     HEMOGLOBIN A1C WITH EAG          Wt tobacco abuse not at target goal, the appropriate diet discussed. lifelong compliance to reduce risk is stressed. A regular exercise program,  maintain normal body weight, fitness,  to avoid fast food Advised, recheck for flp and lft in 3 months rtc fasting, meds side effect and compliance advised. Concern abdout COVID-19 addressed and detailed, pt was told that the best way to prevent illness is by protection, to Wear a facemask , having social distance, and to get tested if possible, Pursuant to the emergency declaration under the [address] and [facility] waiver authority and the [employer] Act, this Virtual Visit was conducted, with patient's consent, to reduce the patient's risk of exposure to COVID-19 and provide continuity of care for an established patient. Pt was also told if develop dyspnea needs to call 911 or go to er, call for karime advise, pt agreed with todays recommendations, Services were provided through a Video synchronous discussion virtually to substitute for in-person appointment.

## 2020-08-31 ENCOUNTER — TELEPHONE (OUTPATIENT)
Dept: FAMILY MEDICINE CLINIC | Age: 35
End: 2020-08-31

## 2020-12-15 ENCOUNTER — HOSPITAL ENCOUNTER (EMERGENCY)
Age: 35
Discharge: HOME | End: 2020-12-15
Attending: EMERGENCY MEDICINE
Payer: MEDICAID

## 2020-12-15 ENCOUNTER — APPOINTMENT (OUTPATIENT)
Dept: CT IMAGING | Age: 35
End: 2020-12-15
Attending: EMERGENCY MEDICINE
Payer: MEDICAID

## 2020-12-15 ENCOUNTER — APPOINTMENT (OUTPATIENT)
Dept: GENERAL RADIOLOGY | Age: 35
End: 2020-12-15
Attending: EMERGENCY MEDICINE
Payer: MEDICAID

## 2020-12-15 VITALS
WEIGHT: 220 LBS | RESPIRATION RATE: 16 BRPM | HEIGHT: 72 IN | DIASTOLIC BLOOD PRESSURE: 85 MMHG | OXYGEN SATURATION: 96 % | TEMPERATURE: 98.2 F | SYSTOLIC BLOOD PRESSURE: 127 MMHG | HEART RATE: 90 BPM | BODY MASS INDEX: 29.8 KG/M2

## 2020-12-15 DIAGNOSIS — R55 SYNCOPE, UNSPECIFIED SYNCOPE TYPE: ICD-10-CM

## 2020-12-15 DIAGNOSIS — R42 DIZZINESS: Primary | ICD-10-CM

## 2020-12-15 LAB
ALBUMIN SERPL-MCNC: 3.6 G/DL (ref 3.5–5)
ALBUMIN/GLOB SERPL: 0.9 {RATIO} (ref 1.1–2.2)
ALP SERPL-CCNC: 86 U/L (ref 45–117)
ALT SERPL-CCNC: 30 U/L (ref 12–78)
ANION GAP SERPL CALC-SCNC: 4 MMOL/L (ref 5–15)
AST SERPL-CCNC: 18 U/L (ref 15–37)
BASOPHILS # BLD: 0 K/UL (ref 0–0.1)
BASOPHILS NFR BLD: 0 % (ref 0–1)
BILIRUB SERPL-MCNC: 0.5 MG/DL (ref 0.2–1)
BUN SERPL-MCNC: 11 MG/DL (ref 6–20)
BUN/CREAT SERPL: 10 (ref 12–20)
CALCIUM SERPL-MCNC: 9.5 MG/DL (ref 8.5–10.1)
CHLORIDE SERPL-SCNC: 104 MMOL/L (ref 97–108)
CO2 SERPL-SCNC: 28 MMOL/L (ref 21–32)
CREAT SERPL-MCNC: 1.13 MG/DL (ref 0.7–1.3)
DIFFERENTIAL METHOD BLD: ABNORMAL
EOSINOPHIL # BLD: 0.1 K/UL (ref 0–0.4)
EOSINOPHIL NFR BLD: 2 % (ref 0–7)
ERYTHROCYTE [DISTWIDTH] IN BLOOD BY AUTOMATED COUNT: 12.7 % (ref 11.5–14.5)
GLOBULIN SER CALC-MCNC: 3.9 G/DL (ref 2–4)
GLUCOSE SERPL-MCNC: 92 MG/DL (ref 65–100)
HCT VFR BLD AUTO: 41.6 % (ref 36.6–50.3)
HGB BLD-MCNC: 13.7 G/DL (ref 12.1–17)
IMM GRANULOCYTES # BLD AUTO: 0 K/UL (ref 0–0.04)
IMM GRANULOCYTES NFR BLD AUTO: 0 % (ref 0–0.5)
LYMPHOCYTES # BLD: 1.6 K/UL (ref 0.8–3.5)
LYMPHOCYTES NFR BLD: 23 % (ref 12–49)
MCH RBC QN AUTO: 29.1 PG (ref 26–34)
MCHC RBC AUTO-ENTMCNC: 32.9 G/DL (ref 30–36.5)
MCV RBC AUTO: 88.3 FL (ref 80–99)
MONOCYTES # BLD: 0.6 K/UL (ref 0–1)
MONOCYTES NFR BLD: 8 % (ref 5–13)
NEUTS SEG # BLD: 4.7 K/UL (ref 1.8–8)
NEUTS SEG NFR BLD: 67 % (ref 32–75)
NRBC # BLD: 0 K/UL (ref 0–0.01)
NRBC BLD-RTO: 0 PER 100 WBC
PLATELET # BLD AUTO: 357 K/UL (ref 150–400)
PMV BLD AUTO: 8.6 FL (ref 8.9–12.9)
POTASSIUM SERPL-SCNC: 4.2 MMOL/L (ref 3.5–5.1)
PROT SERPL-MCNC: 7.5 G/DL (ref 6.4–8.2)
RBC # BLD AUTO: 4.71 M/UL (ref 4.1–5.7)
SODIUM SERPL-SCNC: 136 MMOL/L (ref 136–145)
TROPONIN I SERPL-MCNC: <0.05 NG/ML
WBC # BLD AUTO: 7 K/UL (ref 4.1–11.1)

## 2020-12-15 PROCEDURE — 96360 HYDRATION IV INFUSION INIT: CPT

## 2020-12-15 PROCEDURE — 85025 COMPLETE CBC W/AUTO DIFF WBC: CPT

## 2020-12-15 PROCEDURE — 36415 COLL VENOUS BLD VENIPUNCTURE: CPT

## 2020-12-15 PROCEDURE — 70450 CT HEAD/BRAIN W/O DYE: CPT

## 2020-12-15 PROCEDURE — 74011250636 HC RX REV CODE- 250/636: Performed by: EMERGENCY MEDICINE

## 2020-12-15 PROCEDURE — 80053 COMPREHEN METABOLIC PANEL: CPT

## 2020-12-15 PROCEDURE — 71045 X-RAY EXAM CHEST 1 VIEW: CPT

## 2020-12-15 PROCEDURE — 84484 ASSAY OF TROPONIN QUANT: CPT

## 2020-12-15 PROCEDURE — 99283 EMERGENCY DEPT VISIT LOW MDM: CPT

## 2020-12-15 PROCEDURE — 93005 ELECTROCARDIOGRAM TRACING: CPT

## 2020-12-15 RX ORDER — LORAZEPAM 1 MG/1
0.5 TABLET ORAL
Status: DISCONTINUED | OUTPATIENT
Start: 2020-12-15 | End: 2020-12-15 | Stop reason: HOSPADM

## 2020-12-15 RX ORDER — ONDANSETRON 2 MG/ML
4 INJECTION INTRAMUSCULAR; INTRAVENOUS
Status: DISCONTINUED | OUTPATIENT
Start: 2020-12-15 | End: 2020-12-15 | Stop reason: HOSPADM

## 2020-12-15 RX ADMIN — SODIUM CHLORIDE 1000 ML: 900 INJECTION, SOLUTION INTRAVENOUS at 16:47

## 2020-12-15 NOTE — ED TRIAGE NOTES
Patient presents to the ED with c/o dizziness and syncope today x3 days. Pt reports falling at home in his living room. Pt reports being under increased stress recently. Pt reports that the dizziness is worse with standing.
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
## 2020-12-15 NOTE — ED NOTES
Emergency Department Nursing Plan of Care       The Nursing Plan of Care is developed from the Nursing assessment and Emergency Department Attending provider initial evaluation. The plan of care may be reviewed in the ED Provider note.     The Plan of Care was developed with the following considerations:   Patient / Family readiness to learn indicated by:verbalized understanding  Persons(s) to be included in education: patient  Barriers to Learning/Limitations:No    Signed     Adra Sang    12/15/2020   4:13 PM

## 2020-12-15 NOTE — DISCHARGE INSTRUCTIONS
You were evaluated in the emergency department for dizziness and possible passing out episode. Your examination was reassuring as was your work-up including CT scan, blood work, ekg, and chest xray. It will be important for you to follow-up with your primary care physician in 3-5 days. If you develop worsening symptoms such as another passing out episode, chest pain, shortness of breath, or worsening dizziness, please return to the emergency department immediately.

## 2020-12-15 NOTE — ED NOTES
Pt arrives in the ED with complaints of syncopal episode PTA today. Pt reports intermittent dizziness over past three days. Pt denies hitting head or LOC.

## 2020-12-16 LAB
ATRIAL RATE: 83 BPM
CALCULATED P AXIS, ECG09: 7 DEGREES
CALCULATED R AXIS, ECG10: 32 DEGREES
CALCULATED T AXIS, ECG11: 15 DEGREES
DIAGNOSIS, 93000: NORMAL
P-R INTERVAL, ECG05: 144 MS
Q-T INTERVAL, ECG07: 342 MS
QRS DURATION, ECG06: 78 MS
QTC CALCULATION (BEZET), ECG08: 401 MS
VENTRICULAR RATE, ECG03: 83 BPM

## 2021-01-11 ENCOUNTER — VIRTUAL VISIT (OUTPATIENT)
Dept: FAMILY MEDICINE CLINIC | Age: 36
End: 2021-01-11
Payer: MEDICAID

## 2021-01-11 DIAGNOSIS — F32.A FATIGUE DUE TO DEPRESSION: ICD-10-CM

## 2021-01-11 DIAGNOSIS — M54.50 CHRONIC BILATERAL LOW BACK PAIN WITHOUT SCIATICA: ICD-10-CM

## 2021-01-11 DIAGNOSIS — R53.83 FATIGUE DUE TO DEPRESSION: ICD-10-CM

## 2021-01-11 DIAGNOSIS — F33.9 RECURRENT DEPRESSIVE DISORDER (HCC): ICD-10-CM

## 2021-01-11 DIAGNOSIS — R35.89 POLYURIA: ICD-10-CM

## 2021-01-11 DIAGNOSIS — Z71.89 ADVICE GIVEN ABOUT COVID-19 VIRUS INFECTION: ICD-10-CM

## 2021-01-11 DIAGNOSIS — E78.2 MIXED HYPERLIPIDEMIA: Primary | ICD-10-CM

## 2021-01-11 DIAGNOSIS — G89.29 CHRONIC BILATERAL LOW BACK PAIN WITHOUT SCIATICA: ICD-10-CM

## 2021-01-11 PROCEDURE — 99214 OFFICE O/P EST MOD 30 MIN: CPT | Performed by: FAMILY MEDICINE

## 2021-01-11 RX ORDER — CIPROFLOXACIN 500 MG/1
500 TABLET ORAL 2 TIMES DAILY
Qty: 20 TAB | Refills: 0 | Status: SHIPPED | OUTPATIENT
Start: 2021-01-11 | End: 2021-01-21

## 2021-01-11 RX ORDER — HYDROXYZINE 50 MG/1
50 TABLET, FILM COATED ORAL DAILY
Qty: 30 TAB | Refills: 1 | Status: SHIPPED | OUTPATIENT
Start: 2021-01-11 | End: 2021-07-15 | Stop reason: SDUPTHER

## 2021-01-11 RX ORDER — IBUPROFEN 600 MG/1
600 TABLET ORAL
Qty: 18 TAB | Refills: 0 | Status: SHIPPED | OUTPATIENT
Start: 2021-01-11 | End: 2021-01-17

## 2021-01-11 RX ORDER — SIMVASTATIN 20 MG/1
20 TABLET, FILM COATED ORAL
Qty: 30 TAB | Refills: 3 | Status: SHIPPED | OUTPATIENT
Start: 2021-01-11 | End: 2021-07-09

## 2021-01-11 RX ORDER — METHYLPREDNISOLONE 4 MG/1
TABLET ORAL
Qty: 1 DOSE PACK | Refills: 0 | Status: SHIPPED | OUTPATIENT
Start: 2021-01-11 | End: 2021-02-11 | Stop reason: ALTCHOICE

## 2021-01-11 NOTE — PROGRESS NOTES
HISTORY OF PRESENT ILLNESS  Roshni Marroquin is a 28 y.o. male. Pt's main concerns were provided on virtual visit, a telemed format,  pt is w/ comorbid medical history and unaware of been exposed to covid-19 individual, Pt Have been staying at home for couple of wks,  pt has no fever no cough no dyspnea, no ha, not dizzy, nl smell nl taste, no N/V/D, no body ache. Depression with the anxiety and panic states of mind    nicley controlled with the current meds, not able to tolerate any SSRI or other recommended antidepressive or antianxiety meds except for the current TWIN enhancers,   Patient state that it is getting better: pt states and reports of feeling less anxius, less guilty feeling,  less Hoplessness,ns/nh,ni,nh, less trouble with weight gain or loss, no tendency of etoh or illicit drug use, more ability of sleep, more ablitiy  to concentrate  at home with the current medications,and all together a safe feeling at home       increase urine freq  and back pain for couple of yrs, no discharge+safe sex, no burning sensation but polyuria feeling thirsty, not seen blood in the urine, +fhx of diabetic state, has had nl A1c in 2020 and has had back injury playing b ball, 8/10, able to walk no incontinence,   Current Outpatient Medications   Medication Sig Dispense Refill    hydrOXYzine HCL (ATARAX) 50 mg tablet Take 1 Tab by mouth daily. Indications: anxious 30 Tab 1    simvastatin (ZOCOR) 20 mg tablet Take 1 Tab by mouth nightly. 30 Tab 3    trimethoprim-polymyxin b (POLYTRIM) ophthalmic solution Administer 1 Drop to both eyes every four (4) hours. Indications: pink eye from bacterial infection (Patient not taking: Reported on 1/11/2021) 10 mL 0    loratadine (Claritin) 10 mg tablet Take 1 Tab by mouth daily.  (Patient not taking: Reported on 1/11/2021) 10 Tab 0     No Known Allergies  Past Medical History:   Diagnosis Date    Anxiety     Anxiety     Depression     Hypercholesterolemia      History reviewed. No pertinent surgical history. Family History   Problem Relation Age of Onset    Hypertension Mother     No Known Problems Father      Social History     Tobacco Use    Smoking status: Current Every Day Smoker     Packs/day: 0.50    Smokeless tobacco: Never Used   Substance Use Topics    Alcohol use: Yes     Alcohol/week: 3.0 standard drinks     Types: 3 Cans of beer per week      Lab Results   Component Value Date/Time    WBC 7.0 12/15/2020 03:48 PM    HGB 13.7 12/15/2020 03:48 PM    HCT 41.6 12/15/2020 03:48 PM    PLATELET 512 68/01/9776 03:48 PM    MCV 88.3 12/15/2020 03:48 PM     Lab Results   Component Value Date/Time    TSH 1.590 07/29/2020 02:46 PM         Review of Systems   Constitutional: Negative for chills, fever and malaise/fatigue. HENT: Negative for nosebleeds. Eyes: Negative for pain. Respiratory: Negative for cough and wheezing. Cardiovascular: Negative for chest pain and leg swelling. Gastrointestinal: Negative for constipation, diarrhea and nausea. Genitourinary: Positive for dysuria, frequency and urgency. Musculoskeletal: Positive for back pain. Negative for joint pain and myalgias. Skin: Negative for rash. Neurological: Negative for loss of consciousness and headaches. Endo/Heme/Allergies: Does not bruise/bleed easily. Psychiatric/Behavioral: Negative for depression. The patient is not nervous/anxious and does not have insomnia. All other systems reviewed and are negative. Physical Exam  Constitutional:       Appearance: Normal appearance. HENT:      Head: Normocephalic and atraumatic. Nose: Nose normal. No congestion. Neurological:      Mental Status: He is alert and oriented to person, place, and time. Psychiatric:         Mood and Affect: Mood normal.         Behavior: Behavior normal.         Thought Content:  Thought content normal.         Judgment: Judgment normal.         ASSESSMENT and PLAN  Diagnoses and all orders for this visit: 1. Mixed hyperlipidemia  -     hydrOXYzine HCL (ATARAX) 50 mg tablet; Take 1 Tab by mouth daily. Indications: anxious  -     simvastatin (ZOCOR) 20 mg tablet; Take 1 Tab by mouth nightly. 2. Advice given about COVID-19 virus infection  -     hydrOXYzine HCL (ATARAX) 50 mg tablet; Take 1 Tab by mouth daily. Indications: anxious  -     simvastatin (ZOCOR) 20 mg tablet; Take 1 Tab by mouth nightly. 3. Fatigue due to depression  -     hydrOXYzine HCL (ATARAX) 50 mg tablet; Take 1 Tab by mouth daily. Indications: anxious  -     simvastatin (ZOCOR) 20 mg tablet; Take 1 Tab by mouth nightly. 4. Recurrent depressive disorder (HCC)  -     hydrOXYzine HCL (ATARAX) 50 mg tablet; Take 1 Tab by mouth daily. Indications: anxious  -     simvastatin (ZOCOR) 20 mg tablet; Take 1 Tab by mouth nightly.

## 2021-01-11 NOTE — PROGRESS NOTES
Chief Complaint   Patient presents with    Urinary Frequency     1. Have you been to the ER, urgent care clinic since your last visit? Hospitalized since your last visit? No    2. Have you seen or consulted any other health care providers outside of the [address] [place] [address] since your last visit? Include any pap smears or colon screening. No    Call placed to pt. Verified patient with two type of identifiers.   c/o frequent urination and lower   back pain  X months, getting worse

## 2021-02-11 ENCOUNTER — OFFICE VISIT (OUTPATIENT)
Dept: FAMILY MEDICINE CLINIC | Age: 36
End: 2021-02-11
Payer: MEDICAID

## 2021-02-11 VITALS
TEMPERATURE: 98.4 F | HEIGHT: 72 IN | DIASTOLIC BLOOD PRESSURE: 80 MMHG | BODY MASS INDEX: 28.5 KG/M2 | WEIGHT: 210.4 LBS | HEART RATE: 106 BPM | SYSTOLIC BLOOD PRESSURE: 126 MMHG | RESPIRATION RATE: 14 BRPM | OXYGEN SATURATION: 98 %

## 2021-02-11 DIAGNOSIS — F33.1 RECURRENT DEPRESSIVE DISORDER, CURRENT EPISODE MODERATE (HCC): ICD-10-CM

## 2021-02-11 DIAGNOSIS — E73.9 LACTOSE INTOLERANCE: ICD-10-CM

## 2021-02-11 DIAGNOSIS — Z23 ENCOUNTER FOR IMMUNIZATION: ICD-10-CM

## 2021-02-11 DIAGNOSIS — K52.9 CHRONIC DIARRHEA: Primary | ICD-10-CM

## 2021-02-11 DIAGNOSIS — Z83.79 FAMILY HISTORY OF CROHN'S DISEASE: ICD-10-CM

## 2021-02-11 DIAGNOSIS — R35.89 FREQUENCY OF URINATION AND POLYURIA: ICD-10-CM

## 2021-02-11 DIAGNOSIS — R35.0 FREQUENCY OF URINATION AND POLYURIA: ICD-10-CM

## 2021-02-11 PROCEDURE — 90686 IIV4 VACC NO PRSV 0.5 ML IM: CPT | Performed by: FAMILY MEDICINE

## 2021-02-11 PROCEDURE — 99214 OFFICE O/P EST MOD 30 MIN: CPT | Performed by: FAMILY MEDICINE

## 2021-02-11 PROCEDURE — 90471 IMMUNIZATION ADMIN: CPT | Performed by: FAMILY MEDICINE

## 2021-02-11 RX ORDER — NORTRIPTYLINE HYDROCHLORIDE 25 MG/1
25 CAPSULE ORAL
Qty: 30 CAP | Refills: 1 | Status: SHIPPED | OUTPATIENT
Start: 2021-02-11 | End: 2021-04-16

## 2021-02-11 NOTE — PATIENT INSTRUCTIONS
Adjustment Disorder: Care Instructions  Your Care Instructions     Adjustment disorder means that you have emotional or behavioral problems because of stress. But your response to the stress is far more severe than a normal response. It is severe enough to affect your work or social life and may cause depression and physical pains and problems. Events that may cause this response can include a divorce, money problems, or starting school or a new job. It might be anything that causes some stress. This disorder is most often a short-term problem. It happens within 3 months of the stressful event or change. If the response lasts longer than 6 months after the event ends, you may have a more serious disorder. Follow-up care is a key part of your treatment and safety. Be sure to make and go to all appointments, and call your doctor if you are having problems. It's also a good idea to know your test results and keep a list of the medicines you take. How can you care for yourself at home? · Go to all counseling sessions. Do not skip any because you are feeling better. · If your doctor prescribed medicines, take them exactly as prescribed. Call your doctor if you think you are having a problem with your medicine. You will get more details on the specific medicines your doctor prescribes. · Discuss the causes of your stress with a good friend or family member. Or you can join a support group for people with similar problems. Talking to others sometimes relieves stress. · Get at least 30 minutes of exercise on most days of the week. Walking is a good choice. You also may want to do other activities, such as running, swimming, cycling, or playing tennis or team sports. Relaxation techniques  Do relaxation exercises 10 to 20 minutes a day. You can play soothing, relaxing music while you do them, if you wish. · Tell others in your house that you are going to do your relaxation exercises.  Ask them not to disturb you.  · Find a comfortable, quiet place. · Lie down on your back, or sit with your back straight. · Focus on your breathing. Make it slow and steady. · Breathe in through your nose. Breathe out through either your nose or mouth. · Breathe deeply, filling up the area between your navel and your rib cage. Breathe so that your belly goes up and down. · Do not hold your breath. · Breathe like this for 5 to 10 minutes. Notice the feeling of calmness throughout your whole body. As you continue to breathe slowly and deeply, relax by doing these next steps for another 5 to 10 minutes:  · Tighten and relax each muscle group in your body. Start at your toes, and work your way up to your head. · Imagine your muscle groups relaxing and getting heavy. · Empty your mind of all thoughts. · Let yourself relax more and more deeply. · Be aware of the state of calmness that surrounds you. · When your relaxation time is over, you can bring yourself back to alertness by moving your fingers and toes. Then move your hands and feet. And then move your entire body. Sometimes people fall asleep during relaxation. But they most often wake up soon. · Always give yourself time to return to full alertness before you drive a car. Wait to do anything that might cause an accident if you are not fully alert. Never play a relaxation tape while you drive a car. When should you call for help? Call 911 anytime you think you may need emergency care. For example, call if:    · You feel you cannot stop from hurting yourself or someone else. Keep the numbers for these national suicide hotlines: 0-205-407-TALK (6-685-820-371.120.3749) and 6-201-TXAAXSN (7-826.840.6564). If you or someone you know talks about suicide or feeling hopeless, get help right away.    Watch closely for changes in your health, and be sure to contact your doctor if:    · You have new anxiety, or your anxiety gets worse.     · You have been feeling sad, depressed, or hopeless or have lost interest in things that you usually enjoy.     · You do not get better as expected. Where can you learn more? Go to http://www.gray.com/  Enter R087 in the search box to learn more about \"Adjustment Disorder: Care Instructions. \"  Current as of: December 16, 2019               Content Version: 12.6  © 6426-2725 "[employer]. Care instructions adapted under license by [employer] (which disclaims liability or warranty for this information). If you have questions about a medical condition or this instruction, always ask your healthcare professional. [address] any warranty or liability for your use of this information. Diarrhea: Care Instructions  Your Care Instructions     Diarrhea is loose, watery stools (bowel movements). The exact cause is often hard to find. Sometimes diarrhea is your body's way of getting rid of what caused an upset stomach. Viruses, food poisoning, and many medicines can cause diarrhea. Some people get diarrhea in response to emotional stress, anxiety, or certain foods. Almost everyone has diarrhea now and then. It usually isn't serious, and your stools will return to normal soon. The important thing to do is replace the fluids you have lost, so you can prevent dehydration. The doctor has checked you carefully, but problems can develop later. If you notice any problems or new symptoms, get medical treatment right away. Follow-up care is a key part of your treatment and safety. Be sure to make and go to all appointments, and call your doctor if you are having problems. It's also a good idea to know your test results and keep a list of the medicines you take. How can you care for yourself at home? · Watch for signs of dehydration, which means your body has lost too much water. Dehydration is a serious condition and should be treated right away. Signs of dehydration are:  ?  Increasing thirst and dry eyes and mouth. ? Feeling faint or lightheaded. ? A smaller amount of urine than normal.  · To prevent dehydration, drink plenty of fluids. Choose water and other caffeine-free clear liquids until you feel better. If you have kidney, heart, or liver disease and have to limit fluids, talk with your doctor before you increase the amount of fluids you drink. · Begin eating small amounts of mild foods the next day, if you feel like it. ? Try yogurt that has live cultures of Lactobacillus. (Check the label.)  ? Avoid spicy foods, fruits, alcohol, and caffeine until 48 hours after all symptoms are gone. ? Avoid chewing gum that contains sorbitol. ? Avoid dairy products (except for yogurt with Lactobacillus) while you have diarrhea and for 3 days after symptoms are gone. · The doctor may recommend that you take over-the-counter medicine, such as loperamide (Imodium), if you still have diarrhea after 6 hours. Read and follow all instructions on the label. Do not use this medicine if you have bloody diarrhea, a high fever, or other signs of serious illness. Call your doctor if you think you are having a problem with your medicine. When should you call for help? Call 911 anytime you think you may need emergency care. For example, call if:    · You passed out (lost consciousness).     · Your stools are maroon or very bloody. Call your doctor now or seek immediate medical care if:    · You are dizzy or lightheaded, or you feel like you may faint.     · Your stools are black and look like tar, or they have streaks of blood.     · You have new or worse belly pain.     · You have symptoms of dehydration, such as:  ? Dry eyes and a dry mouth. ? Passing only a little dark urine. ? Feeling thirstier than usual.     · You have a new or higher fever.    Watch closely for changes in your health, and be sure to contact your doctor if:    · Your diarrhea is getting worse.     · You see pus in the diarrhea.     · You are not getting better after 2 days (48 hours). Where can you learn more? Go to http://www.gray.com/  Enter [id number] in the search box to learn more about \"Diarrhea: Care Instructions. \"  Current as of: June 26, 2019               Content Version: 12.6  © 5236-7420 [employer]. Care instructions adapted under license by [employer] (which disclaims liability or warranty for this information). If you have questions about a medical condition or this instruction, always ask your healthcare professional. [address] any warranty or liability for your use of this information.

## 2021-02-11 NOTE — PROGRESS NOTES
HISTORY OF PRESENT ILLNESS  Gabbi White is a 35 y.o. male.  Feeling anxious has a sick father at home and the oldest in the family takes care of sibling, Patient's current medical condition is not controlled with medications, currently Celexa and Zoloft Prozac,   Patient state that it is not getting better: pt states and reports of feeling anxius, some guilty feeling, no Hoplessness, patient at this time has ns/nh,ni,nh, and some trouble with weight gain, less ability of sleep, okay ablitiy  to concentrate at home with the current medications, and all together a safe feeling at home  Diarrhea  Had no resturant food and had  watery, none bloody, pt had never done a colonoscopy, no hemorrhoids not fever, no temp, ++gas no abd pain, +nausea and vomigitn couple times last thurday since then she has not throuwn up, appetite improving,   no fhx of colon cancer, no tarry stool, no abdominal pain, eats varieties of foods, no hc of UC, nor of Crohn's Dz, currently had been compliant with stool softner colrite 2 tbas dailysince last yr in November, for constipation,    210 lb 6.4 oz (95.4 kg)  as of 2/11/2021 -- 210 lb 6.4 oz (95.4 kg) -- -- 220 lb (99.8 kg) 220 lb (           Current Outpatient Medications   Medication Sig Dispense Refill   • hydrOXYzine HCL (ATARAX) 50 mg tablet Take 1 Tab by mouth daily. Indications: anxious 30 Tab 1   • simvastatin (ZOCOR) 20 mg tablet Take 1 Tab by mouth nightly. 30 Tab 3   • trimethoprim-polymyxin b (POLYTRIM) ophthalmic solution Administer 1 Drop to both eyes every four (4) hours. Indications: pink eye from bacterial infection (Patient not taking: Reported on 1/11/2021) 10 mL 0   • loratadine (Claritin) 10 mg tablet Take 1 Tab by mouth daily. (Patient not taking: Reported on 1/11/2021) 10 Tab 0     No Known Allergies  Past Medical History:   Diagnosis Date   • Anxiety    • Anxiety    • Depression    • Hypercholesterolemia      No past surgical history on file.  Family History  Problem Relation Age of Onset    Hypertension Mother     No Known Problems Father      Social History     Tobacco Use    Smoking status: Current Every Day Smoker     Packs/day: 0.50    Smokeless tobacco: Never Used   Substance Use Topics    Alcohol use: Yes     Alcohol/week: 3.0 standard drinks     Types: 3 Cans of beer per week      Lab Results   Component Value Date/Time    Hemoglobin A1c 5.3 07/29/2020 02:46 PM    Glucose 92 12/15/2020 03:48 PM    LDL, calculated 149 (H) 07/29/2020 02:46 PM    Creatinine 1.13 12/15/2020 03:48 PM      Lab Results   Component Value Date/Time    TSH 1.590 07/29/2020 02:46 PM         Review of Systems   Constitutional: Negative for chills and fever. HENT: Negative for congestion and nosebleeds. Eyes: Negative for blurred vision and pain. Respiratory: Negative for cough, shortness of breath and wheezing. Cardiovascular: Negative for chest pain and leg swelling. Gastrointestinal: Negative for constipation, diarrhea, nausea and vomiting. Genitourinary: Negative for dysuria and frequency. Musculoskeletal: Negative for joint pain and myalgias. Skin: Negative for itching and rash. Neurological: Negative for dizziness, loss of consciousness and headaches. Psychiatric/Behavioral: Negative for depression. The patient is not nervous/anxious and does not have insomnia. Physical Exam  Vitals signs and nursing note reviewed. Constitutional:       Appearance: He is well-developed. HENT:      Head: Normocephalic and atraumatic. Mouth/Throat:      Pharynx: No oropharyngeal exudate. Eyes:      Conjunctiva/sclera: Conjunctivae normal.   Neck:      Musculoskeletal: Normal range of motion and neck supple. Cardiovascular:      Rate and Rhythm: Normal rate and regular rhythm. Heart sounds: Normal heart sounds. No murmur. Pulmonary:      Effort: Pulmonary effort is normal. No respiratory distress. Breath sounds: Normal breath sounds.    Abdominal: General: Bowel sounds are normal. There is no distension. Palpations: Abdomen is soft. Tenderness: There is no rebound. Musculoskeletal:         General: No tenderness. Skin:     General: Skin is warm. Findings: No erythema. Neurological:      Mental Status: He is alert and oriented to person, place, and time. Psychiatric:         Behavior: Behavior normal.         ASSESSMENT and PLAN  Diagnoses and all orders for this visit:    1. Chronic diarrhea  -     REFERRAL TO GASTROENTEROLOGY  -     CBC W/O DIFF; Future  -     METABOLIC PANEL, COMPREHENSIVE; Future  -     LIPID PANEL; Future  -     TSH 3RD GENERATION; Future  -     URINALYSIS W/ RFLX MICROSCOPIC; Future  -     TSH 3RD GENERATION  -     VITAMIN D, 25 HYDROXY  -     VITAMIN B6  -     VITAMIN B12 & FOLATE  -     VITAMIN B1, WHOLE BLOOD    2. Encounter for immunization  -     INFLUENZA VIRUS VAC QUAD,SPLIT,PRESV FREE SYRINGE IM  -     ADMIN INFLUENZA VIRUS VAC    3. Family history of Crohn's disease  -     REFERRAL TO GASTROENTEROLOGY    4. Frequency of urination and polyuria    5. Recurrent depressive disorder, current episode moderate (HCC)  -     CBC W/O DIFF; Future  -     METABOLIC PANEL, COMPREHENSIVE; Future  -     LIPID PANEL; Future  -     TSH 3RD GENERATION; Future  -     URINALYSIS W/ RFLX MICROSCOPIC; Future  -     TSH 3RD GENERATION  -     VITAMIN D, 25 HYDROXY  -     VITAMIN B6  -     VITAMIN B12 & FOLATE  -     VITAMIN B1, WHOLE BLOOD  -     REFERRAL TO SOCIAL WORK    6. Lactose intolerance    Other orders  -     nortriptyline (PAMELOR) 25 mg capsule; Take 1 Cap by mouth nightly.             Small meal more frequent, increase po fluid intake sport drinks, peptobismuth 2 tabs q8hrs, avoid heavy meals and overeating, no restaurant foods, meds side effect and compliance advised, rtc if not better in 3-5 days, tylenol for pain, observe for any sign of blood in urine or stool  obtain cbc, cmp  To ensure anemia and electrolytes ian=f  Stop colrit for now, restart after lbm gone away  Promethazine w/ codeine for nausea and lbm  Pedialyte form pharm dilution with half 1:1 for the next 2-3 days  Look for improvement

## 2021-02-11 NOTE — PROGRESS NOTES
Chief Complaint   Patient presents with    Diarrhea     follow up     1. Have you been to the ER, urgent care clinic since your last visit? Hospitalized since your last visit? No    2. Have you seen or consulted any other health care providers outside of the [address] [place] [address] since your last visit? Include any pap smears or colon screening. No    Verified patient with two type of identifiers. Seen virtually on 1/11/21 for diarrhea , frequent urination and lower  back pain  X 2-3 months, getting worse , taking meds as prescribed. After obtaining consent, and per orders of , flu vaccine  given to  Left deltoid  IM  . Patient instructed to remain in clinic for 15 minutes afterwards, and to report any adverse reaction to me immediately.  Patient did not have any adverse reactions during this office visit

## 2021-04-16 RX ORDER — NORTRIPTYLINE HYDROCHLORIDE 25 MG/1
CAPSULE ORAL
Qty: 30 CAP | Refills: 1 | Status: SHIPPED | OUTPATIENT
Start: 2021-04-16 | End: 2022-01-27

## 2021-06-20 ENCOUNTER — HOSPITAL ENCOUNTER (EMERGENCY)
Age: 36
Discharge: HOME | End: 2021-06-20
Attending: EMERGENCY MEDICINE
Payer: MEDICAID

## 2021-06-20 VITALS
TEMPERATURE: 98.6 F | DIASTOLIC BLOOD PRESSURE: 82 MMHG | OXYGEN SATURATION: 99 % | RESPIRATION RATE: 20 BRPM | HEIGHT: 72 IN | WEIGHT: 210 LBS | SYSTOLIC BLOOD PRESSURE: 130 MMHG | HEART RATE: 98 BPM | BODY MASS INDEX: 28.44 KG/M2

## 2021-06-20 DIAGNOSIS — Z20.2 EXPOSURE TO SEXUALLY TRANSMITTED DISEASE (STD): Primary | ICD-10-CM

## 2021-06-20 DIAGNOSIS — Z72.0 TOBACCO ABUSE: ICD-10-CM

## 2021-06-20 LAB
APPEARANCE UR: CLEAR
BACTERIA URNS QL MICRO: NEGATIVE /HPF
BILIRUB UR QL: NEGATIVE
COLOR UR: ABNORMAL
EPITH CASTS URNS QL MICRO: ABNORMAL /LPF
GLUCOSE UR STRIP.AUTO-MCNC: NEGATIVE MG/DL
HGB UR QL STRIP: NEGATIVE
KETONES UR QL STRIP.AUTO: NEGATIVE MG/DL
LEUKOCYTE ESTERASE UR QL STRIP.AUTO: ABNORMAL
NITRITE UR QL STRIP.AUTO: NEGATIVE
PH UR STRIP: 8 [PH] (ref 5–8)
PROT UR STRIP-MCNC: NEGATIVE MG/DL
RBC #/AREA URNS HPF: ABNORMAL /HPF (ref 0–5)
SP GR UR REFRACTOMETRY: 1.02 (ref 1–1.03)
UA: UC IF INDICATED,UAUC: ABNORMAL
UROBILINOGEN UR QL STRIP.AUTO: 1 EU/DL (ref 0.2–1)
WBC URNS QL MICRO: ABNORMAL /HPF (ref 0–4)

## 2021-06-20 PROCEDURE — 74011000250 HC RX REV CODE- 250: Performed by: EMERGENCY MEDICINE

## 2021-06-20 PROCEDURE — 99283 EMERGENCY DEPT VISIT LOW MDM: CPT

## 2021-06-20 PROCEDURE — 81001 URINALYSIS AUTO W/SCOPE: CPT

## 2021-06-20 PROCEDURE — 74011250637 HC RX REV CODE- 250/637: Performed by: EMERGENCY MEDICINE

## 2021-06-20 PROCEDURE — 87086 URINE CULTURE/COLONY COUNT: CPT

## 2021-06-20 PROCEDURE — 96372 THER/PROPH/DIAG INJ SC/IM: CPT

## 2021-06-20 PROCEDURE — 74011250636 HC RX REV CODE- 250/636: Performed by: EMERGENCY MEDICINE

## 2021-06-20 PROCEDURE — 87491 CHLMYD TRACH DNA AMP PROBE: CPT

## 2021-06-20 RX ORDER — AZITHROMYCIN 500 MG/1
1000 TABLET, FILM COATED ORAL
Status: COMPLETED | OUTPATIENT
Start: 2021-06-20 | End: 2021-06-20

## 2021-06-20 RX ADMIN — LIDOCAINE HYDROCHLORIDE 500 MG: 10 INJECTION, SOLUTION EPIDURAL; INFILTRATION; INTRACAUDAL; PERINEURAL at 20:00

## 2021-06-20 RX ADMIN — AZITHROMYCIN MONOHYDRATE 1000 MG: 500 TABLET ORAL at 20:00

## 2021-06-20 NOTE — ED PROVIDER NOTES
EMERGENCY DEPARTMENT HISTORY AND PHYSICAL EXAM      Please note that this dictation was completed with the assistance of \"Dragon\", the computer voice recognition software. Quite often unanticipated grammatical, syntax, homophones, and other interpretive errors are inadvertently transcribed by the computer software. Please disregard these errors and any errors that have escaped final proofreading. Thank you. Patient Name: Nova Montilla  : 1985  MRN: [id number]  History of Presenting Illness     Chief Complaint   Patient presents with    Sexually Transmitted Disease     requesting STD check     History Provided By: Patient    HPI: Nova Montilla, 28 y.o. male with past medical history as documented below presents to the ED with c/o of concern for STI exposure. Patient wants empiric treatment and testing for possible STI. Patient reports that he recently had unprotected sex with his ex-wife and his ex-wife recently was diagnosed with pelvic inflammatory disease. Patient denies any penile discharge. Denies any rash. Denies any testicular or scrotal pain or swelling. Patient has no other complaints at this time. Pt denies any other alleviating or exacerbating factors. Additionally, pt specifically denies any recent fever, chills, headache, nausea, vomiting, abdominal pain, CP, SOB, lightheadedness, dizziness, numbness, weakness, lower extremity swelling, heart palpitations, urinary sxs, diarrhea, constipation, melena, hematochezia, cough, or congestion. There are no other complaints, changes or physical findings pertinent to the HPI at this time. PCP: Aster Isabel MD    Past History   Past Medical History:  Past Medical History:   Diagnosis Date    Anxiety     Anxiety     Depression     Hypercholesterolemia        Past Surgical History:  History reviewed. No pertinent surgical history.     Family History:  Family History   Problem Relation Age of Onset    Hypertension Mother    Kayy Conde No Known Problems Father        Social History:  Social History     Tobacco Use    Smoking status: Current Every Day Smoker     Packs/day: 0.50    Smokeless tobacco: Never Used   Vaping Use    Vaping Use: Never used   Substance Use Topics    Alcohol use: Yes     Alcohol/week: 3.0 standard drinks     Types: 3 Cans of beer per week    Drug use: Not Currently       Allergies:  No Known Allergies    Current Medications:  No current facility-administered medications on file prior to encounter. Current Outpatient Medications on File Prior to Encounter   Medication Sig Dispense Refill    hydrOXYzine HCL (ATARAX) 50 mg tablet Take 1 Tab by mouth daily. Indications: anxious 30 Tab 1    nortriptyline (PAMELOR) 25 mg capsule TAKE 1 CAPSULE BY MOUTH EVERY NIGHT (Patient not taking: Reported on 6/20/2021) 30 Cap 1    simvastatin (ZOCOR) 20 mg tablet Take 1 Tab by mouth nightly. (Patient not taking: Reported on 6/20/2021) 30 Tab 3    trimethoprim-polymyxin b (POLYTRIM) ophthalmic solution Administer 1 Drop to both eyes every four (4) hours. Indications: pink eye from bacterial infection (Patient not taking: Reported on 1/11/2021) 10 mL 0    loratadine (Claritin) 10 mg tablet Take 1 Tab by mouth daily. (Patient not taking: Reported on 1/11/2021) 10 Tab 0     Review of Systems   Review of Systems   Constitutional: Negative. Negative for chills and fever. HENT: Negative. Negative for congestion and sore throat. Eyes: Negative. Respiratory: Negative. Negative for cough, chest tightness, shortness of breath and wheezing. Cardiovascular: Negative. Negative for chest pain, palpitations and leg swelling. Gastrointestinal: Negative. Negative for abdominal distention, abdominal pain, blood in stool, constipation, diarrhea, nausea and vomiting. Endocrine: Negative. Genitourinary: Negative. Negative for difficulty urinating, dysuria, flank pain, frequency, hematuria and urgency.    Musculoskeletal: Negative. Negative for back pain and neck stiffness. Skin: Negative. Negative for rash. Allergic/Immunologic: Negative. Neurological: Negative. Negative for dizziness, syncope, weakness, light-headedness, numbness and headaches. Hematological: Negative. Psychiatric/Behavioral: Negative. Negative for confusion and self-injury. Physical Exam   Physical Exam  Vitals and nursing note reviewed. Constitutional:       Appearance: He is well-developed. He is not toxic-appearing. HENT:      Head: Normocephalic and atraumatic. Mouth/Throat:      Pharynx: No posterior oropharyngeal erythema. Eyes:      Conjunctiva/sclera: Conjunctivae normal.   Cardiovascular:      Rate and Rhythm: Normal rate and regular rhythm. Heart sounds: Normal heart sounds. No murmur heard. No friction rub. No gallop. Pulmonary:      Effort: Pulmonary effort is normal. No respiratory distress. Breath sounds: Normal breath sounds. No wheezing or rales. Chest:      Chest wall: No tenderness. Abdominal:      General: Bowel sounds are normal. There is no distension. Palpations: Abdomen is soft. There is no mass. Tenderness: There is no abdominal tenderness. There is no guarding or rebound. Musculoskeletal:         General: Normal range of motion. Cervical back: Normal range of motion. Skin:     General: Skin is warm. Neurological:      General: No focal deficit present. Mental Status: He is alert and oriented to person, place, and time. Motor: No abnormal muscle tone. Psychiatric:         Behavior: Behavior is cooperative. Diagnostic Study Results     Labs -   I have personally reviewed and interpreted all available laboratory results.    Recent Results (from the past 24 hour(s))   URINALYSIS W/ REFLEX CULTURE    Collection Time: 06/20/21  7:27 PM    Specimen: Urine   Result Value Ref Range    Color YELLOW/STRAW      Appearance CLEAR CLEAR      Specific gravity 1.025 1.003 - 1.030      pH (UA) 8.0 5.0 - 8.0      Protein Negative NEG mg/dL    Glucose Negative NEG mg/dL    Ketone Negative NEG mg/dL    Bilirubin Negative NEG      Blood Negative NEG      Urobilinogen 1.0 0.2 - 1.0 EU/dL    Nitrites Negative NEG      Leukocyte Esterase MODERATE (A) NEG      WBC 20-50 0 - 4 /hpf    RBC 0-5 0 - 5 /hpf    Epithelial cells FEW FEW /lpf    Bacteria Negative NEG /hpf    UA:UC IF INDICATED URINE CULTURE ORDERED (A) CNI         Radiologic Studies -   I have personally reviewed and interpreted all available imaging studies and agree with radiology interpretation and report. No orders to display     CT Results  (Last 48 hours)    None        CXR Results  (Last 48 hours)    None          Medical Decision Making   I reviewed the vital signs, available nursing notes, past medical history, past surgical history, family history and social history. Vital Signs-Reviewed the patient's vital signs. Patient Vitals for the past 24 hrs:   Temp Pulse Resp BP SpO2   06/20/21 1848 98.6 °F (37 °C) 98 20 130/82 99 %       Pulse Oximetry Analysis - 99% on RA    Cardiac Monitor:   Rate: 98 bpm  The cardiac monitor revealed the following rhythm as interpreted by me: Normal Sinus Rhythm       Records Reviewed: Nursing Notes, Old Medical Records, Previous electrocardiograms, Previous Radiology Studies and Previous Laboratory Studies    Provider Notes (Medical Decision Making):   Patient presents with STI exposure. Stable vitals and benign abdominal exam. DDx: Acute cystitis, pyleonephritis, ureteral stone, STI. Will obtain UA and treat accordingly. If patient expresses concern for STI exposure, will test and empirically treat. Also, I provided education on the importance of testing and treating partners and using safe sex practices in the future. Discussed with the patient diagnosis and test results and all questioned fully answered.   They understand the importance of staying well hydrated, taking antibiotics as prescribed to completion. He will follow-up with PCP if any problems arise. ED Course:   I am the first provider for this patient's ED visit today. Initial assessment performed. I discussed presenting problems, concerns and my formulated plan for today's visit with the patient and any available family members at bedside. I encouraged them to ask questions as they arise throughout the visit. TOBACCO COUNSELING:  Upon evaluation, pt expressed that they are a current tobacco user. For approximately 3-5 mins, pt has been counseled on the dangers of smoking and was encouraged to quit as soon as possible in order to decrease further risks to their health. Pt has conveyed their understanding of the risks involved should they continue to use tobacco products. I reviewed our electronic medical record system for any past medical records that were available that may contribute to the patient's current condition, the nursing notes and vital signs from today's visit. ED Orders Placed :  Orders Placed This Encounter    CULTURE, URINE    URINALYSIS W/ REFLEX CULTURE    CHLAMYDIA / GC-AMPLIFIED    cefTRIAXone (ROCEPHIN) 500 mg in lidocaine (PF) (XYLOCAINE) 10 mg/mL (1 %) IM injection    azithromycin (ZITHROMAX) tablet 1,000 mg       ED Medications Administered:  Medications   cefTRIAXone (ROCEPHIN) 500 mg in lidocaine (PF) (XYLOCAINE) 10 mg/mL (1 %) IM injection (has no administration in time range)   azithromycin (ZITHROMAX) tablet 1,000 mg (has no administration in time range)        Progress Note:  Patient has been reassessed and reports feeling better and symptoms have improved significantly after ED treatment. Gabbi White's final labs and imaging have been reviewed with him and available family and/or caregiver. They have been counseled regarding his diagnosis.  He verbally conveys understanding and agreement of the signs, symptoms, diagnosis, treatment and prognosis and additionally agrees to follow up as recommended with Dr. Lawson Turner MD and/or specialist in 24 - 48 hours. He also agrees with the care-plan we created together and conveys that all of his questions have been answered. I have also put together a packet of discharge instructions for him that include: 1) educational information regarding their diagnosis, 2) how to care for their diagnosis at home, as well a 3) list of reasons why they would want to return to the ED prior to their follow-up appointment should the patient's condition change or symptoms worsen. I have answered all questions to the patient's satisfaction. Strict return precautions given. He both understood and agreed with plan as discussed. Vital signs stable for discharge. Disposition:   DISCHARGE  The pt is ready for discharge. The pt's signs, symptoms, diagnosis, and discharge instructions have been discussed and pt has conveyed their understanding. The pt is to follow up as recommended or return to ER should their symptoms worsen. Plan has been discussed and pt is in agreement. Plan:  1. Return precautions as discussed with patient and available family and/or caregiver. 2.   Current Discharge Medication List        Current Facility-Administered Medications:     cefTRIAXone (ROCEPHIN) 500 mg in lidocaine (PF) (XYLOCAINE) 10 mg/mL (1 %) IM injection, 500 mg, IntraMUSCular, NOW, Kay Londono MD    [facility]) tablet 1,000 mg, 1,000 mg, Oral, NOW, Kay Londono MD    Current Outpatient Medications:     hydrOXYzine HCL (ATARAX) 50 mg tablet, Take 1 Tab by mouth daily. Indications: anxious, Disp: 30 Tab, Rfl: 1    nortriptyline (PAMELOR) 25 mg capsule, TAKE 1 CAPSULE BY MOUTH EVERY NIGHT (Patient not taking: Reported on 6/20/2021), Disp: 30 Cap, Rfl: 1    simvastatin (ZOCOR) 20 mg tablet, Take 1 Tab by mouth nightly.  (Patient not taking: Reported on 6/20/2021), Disp: 30 Tab, Rfl: 3    trimethoprim-polymyxin b (POLYTRIM) ophthalmic solution, Administer 1 Drop to both eyes every four (4) hours. Indications: pink eye from bacterial infection (Patient not taking: Reported on 1/11/2021), Disp: 10 mL, Rfl: 0    loratadine (Claritin) 10 mg tablet, Take 1 Tab by mouth daily. (Patient not taking: Reported on 1/11/2021), Disp: 10 Tab, Rfl: 0    3. Follow-up Information     Follow up With Specialties Details Why [address] [facility] EMERGENCY DEPT Emergency Medicine  As needed, If symptoms worsen [address] [facility]    Paras Friday, MD Family Medicine  As needed, If symptoms worsen [address]  [phone]            Instructed to return to ED if worse  Diagnosis   Clinical Impression:  1. Exposure to sexually transmitted disease (STD)    2. Tobacco abuse        Attestation:  Blade Charles MD, am the attending of record for this patient. I personally performed the services described in this documentation on this date, 6/20/2021 for patient, Mulugeta Erickson. I have reviewed the chart and verified that the record is accurate and complete.

## 2021-06-20 NOTE — ED NOTES
Pt reports to ER w/ concern for STD exposure. Ex wife reports to him that she has PID and BV and thinks he should be tested. No urinary symptoms noted. No penile discharge. Alert and oriented x4. Skin warm dry and intact. Ambulates independently. Emergency Department Nursing Plan of Care       The Nursing Plan of Care is developed from the Nursing assessment and Emergency Department Attending provider initial evaluation. The plan of care may be reviewed in the ED Provider note.     The Plan of Care was developed with the following considerations:   Patient / Family readiness to learn indicated by:verbalized understanding  Persons(s) to be included in education: patient  Barriers to Learning/Limitations:No    Signed     Dorene Pointer    6/20/2021   7:41 PM

## 2021-06-20 NOTE — LETTER
6/22/2021      Josie Mazariegos  [facility] [address] [id number]      Dear Mr. Mal Mc were seen in the Emergency Department of [address] [place] [address] on 6/20/21 and had lab and/or radiology tests performed. The gonorrhea, chlamydia from your Emergency Department visit on 6/20/21 was positive. You were treated appropriately during your visit. No further treatment is required. If you have not improved or are worsening, please follow up with your primary care doctor or Emergency department as soon as possible. Your partner needs to be treated. If you have any questions please contact the Emergency Department at [phone].       Sincerely,    Nico Buchanan PA-C    [facility] EMERGENCY DEPT  [address] [place] [id number] [phone]

## 2021-06-21 NOTE — ED NOTES
Discharge instructions were given to the patient by Jonathon Stubbs RN. The patient left the Emergency Department ambulatory, alert and oriented and in no acute distress with 0 prescriptions. The patient was encouraged to call or return to the ED for worsening issues or problems and was encouraged to schedule a follow up appointment for continuing care. The patient verbalized understanding of discharge instructions and prescriptions, all questions were answered. The patient has no further concerns at this time.

## 2021-06-22 LAB
BACTERIA SPEC CULT: NORMAL
C TRACH RRNA SPEC QL NAA+PROBE: POSITIVE
N GONORRHOEA RRNA SPEC QL NAA+PROBE: POSITIVE
PLEASE NOTE:, 188601: ABNORMAL
SERVICE CMNT-IMP: NORMAL
SPECIMEN SOURCE: ABNORMAL

## 2021-07-08 DIAGNOSIS — F32.A FATIGUE DUE TO DEPRESSION: ICD-10-CM

## 2021-07-08 DIAGNOSIS — F33.9 RECURRENT DEPRESSIVE DISORDER (HCC): ICD-10-CM

## 2021-07-08 DIAGNOSIS — E78.2 MIXED HYPERLIPIDEMIA: ICD-10-CM

## 2021-07-08 DIAGNOSIS — R53.83 FATIGUE DUE TO DEPRESSION: ICD-10-CM

## 2021-07-08 DIAGNOSIS — Z71.89 ADVICE GIVEN ABOUT COVID-19 VIRUS INFECTION: ICD-10-CM

## 2021-07-09 RX ORDER — SIMVASTATIN 20 MG/1
TABLET, FILM COATED ORAL
Qty: 30 TABLET | Refills: 3 | Status: SHIPPED | OUTPATIENT
Start: 2021-07-09 | End: 2022-06-02 | Stop reason: SDUPTHER

## 2021-07-15 ENCOUNTER — HOSPITAL ENCOUNTER (EMERGENCY)
Age: 36
Discharge: HOME | End: 2021-07-15
Attending: EMERGENCY MEDICINE
Payer: MEDICAID

## 2021-07-15 VITALS
TEMPERATURE: 98.3 F | SYSTOLIC BLOOD PRESSURE: 122 MMHG | HEART RATE: 87 BPM | BODY MASS INDEX: 29.53 KG/M2 | DIASTOLIC BLOOD PRESSURE: 75 MMHG | WEIGHT: 217.7 LBS | RESPIRATION RATE: 16 BRPM | OXYGEN SATURATION: 98 %

## 2021-07-15 DIAGNOSIS — E78.2 MIXED HYPERLIPIDEMIA: ICD-10-CM

## 2021-07-15 DIAGNOSIS — F41.1 ANXIETY STATE: ICD-10-CM

## 2021-07-15 DIAGNOSIS — F33.9 RECURRENT DEPRESSIVE DISORDER (HCC): ICD-10-CM

## 2021-07-15 DIAGNOSIS — R53.83 FATIGUE DUE TO DEPRESSION: ICD-10-CM

## 2021-07-15 DIAGNOSIS — F43.0 STRESS RESPONSE: ICD-10-CM

## 2021-07-15 DIAGNOSIS — Z71.89 ADVICE GIVEN ABOUT COVID-19 VIRUS INFECTION: ICD-10-CM

## 2021-07-15 DIAGNOSIS — F32.A FATIGUE DUE TO DEPRESSION: ICD-10-CM

## 2021-07-15 DIAGNOSIS — R20.2 PARESTHESIA: Primary | ICD-10-CM

## 2021-07-15 LAB
GLUCOSE BLD STRIP.AUTO-MCNC: 133 MG/DL (ref 65–117)
SERVICE CMNT-IMP: ABNORMAL

## 2021-07-15 PROCEDURE — 74011250636 HC RX REV CODE- 250/636: Performed by: EMERGENCY MEDICINE

## 2021-07-15 PROCEDURE — 96374 THER/PROPH/DIAG INJ IV PUSH: CPT

## 2021-07-15 PROCEDURE — 96361 HYDRATE IV INFUSION ADD-ON: CPT

## 2021-07-15 PROCEDURE — 99284 EMERGENCY DEPT VISIT MOD MDM: CPT

## 2021-07-15 PROCEDURE — 82962 GLUCOSE BLOOD TEST: CPT

## 2021-07-15 RX ORDER — LORAZEPAM 2 MG/ML
0.5 INJECTION INTRAMUSCULAR
Status: COMPLETED | OUTPATIENT
Start: 2021-07-15 | End: 2021-07-15

## 2021-07-15 RX ORDER — HYDROXYZINE 50 MG/1
50 TABLET, FILM COATED ORAL DAILY
Qty: 30 TABLET | Refills: 1 | Status: SHIPPED | OUTPATIENT
Start: 2021-07-15 | End: 2022-01-27

## 2021-07-15 RX ADMIN — LORAZEPAM 0.5 MG: 2 INJECTION INTRAMUSCULAR; INTRAVENOUS at 18:18

## 2021-07-15 RX ADMIN — SODIUM CHLORIDE 1000 ML: 9 INJECTION, SOLUTION INTRAVENOUS at 18:17

## 2021-07-15 NOTE — ED TRIAGE NOTES
Pt states approx 15 minutes ago. He was driving and  his 2nd digit in his right hand began going numb  and has since moved to all fingers in his right hand, up his arm to his mouth on the right side; pts ; pt is alert and oriented x 4; LORD;  equal States his upper and lower lip began going numb; speech clear, no facial droop noted, tongue midline,sensation intact no drift noted in upper or lower extremities, reports that he was dizzy earlier approx and hour ago and states \" I almost fell out? Denied shortness of breath and chestpain, HA . Pt states \" I dont feel right. \" Alert and oriented x 4; pt reports that he is under a lot of stress at this time. Pt reports h/o migraines.

## 2021-07-15 NOTE — ED PROVIDER NOTES
EMERGENCY DEPARTMENT HISTORY AND PHYSICAL EXAM      Date: 7/15/2021  Patient Name: Mitch Bahena    History of Presenting Illness     Chief Complaint   Patient presents with    Numbness     right hand, arm, lip     History Provided By: Patient    HPI: Mitch Bahena, 28 y.o. male with past medical history significant for anxiety, depression, hyperlipidemia, and migraine headaches who presents via private vehicle to the ED with cc of right-sided hand numbness that has progressed to his entire right upper extremity and right face that started approximately 30 minutes prior to arrival.  He also endorses diaphoresis and lightheadedness. He states he has been under a lot of stress recently as his dad is sick with a terminal illness and is in the hospital and he has been helping take care of his 4 younger siblings. He additionally told the nurse that he had an excessive amount of alcohol last night and may be dehydrated. He denies any slurred speech or focal neurologic weakness but does endorse generalized weakness. PMHx: Anxiety, depression, hyperlipidemia, migraine headaches  Social Hx: Smokes 1/2 pack/day, occasional alcohol use, occasional marijuana use    PCP: Yogesh Alvarez MD    There are no other complaints, changes, or physical findings at this time. No current facility-administered medications on file prior to encounter. Current Outpatient Medications on File Prior to Encounter   Medication Sig Dispense Refill    simvastatin (ZOCOR) 20 mg tablet TAKE 1 TABLET BY MOUTH EVERY NIGHT 30 Tablet 3    nortriptyline (PAMELOR) 25 mg capsule TAKE 1 CAPSULE BY MOUTH EVERY NIGHT (Patient not taking: Reported on 6/20/2021) 30 Cap 1    [DISCONTINUED] hydrOXYzine HCL (ATARAX) 50 mg tablet Take 1 Tab by mouth daily. Indications: anxious 30 Tab 1    trimethoprim-polymyxin b (POLYTRIM) ophthalmic solution Administer 1 Drop to both eyes every four (4) hours.  Indications: pink eye from bacterial infection (Patient not taking: Reported on 1/11/2021) 10 mL 0    loratadine (Claritin) 10 mg tablet Take 1 Tab by mouth daily. (Patient not taking: Reported on 1/11/2021) 10 Tab 0     Past History     Past Medical History:  Past Medical History:   Diagnosis Date    Anxiety     Anxiety     Depression     Hypercholesterolemia      Past Surgical History:  History reviewed. No pertinent surgical history. Family History:  Family History   Problem Relation Age of Onset    Hypertension Mother     No Known Problems Father      Social History:  Social History     Tobacco Use    Smoking status: Current Every Day Smoker     Packs/day: 0.50    Smokeless tobacco: Never Used   Vaping Use    Vaping Use: Never used   Substance Use Topics    Alcohol use: Yes     Alcohol/week: 3.0 standard drinks     Types: 3 Cans of beer per week    Drug use: Not Currently     Allergies:  No Known Allergies  Review of Systems   Review of Systems   Constitutional: Negative for chills and fever. HENT: Negative for congestion, rhinorrhea, sneezing and sore throat. Eyes: Negative for redness and visual disturbance. Respiratory: Negative for shortness of breath. Cardiovascular: Negative for chest pain and leg swelling. Gastrointestinal: Negative for abdominal pain, nausea and vomiting. Genitourinary: Negative for difficulty urinating and frequency. Musculoskeletal: Negative for back pain, myalgias and neck stiffness. Skin: Negative for rash. Neurological: Positive for weakness, light-headedness and numbness. Negative for dizziness, syncope and headaches. Hematological: Negative for adenopathy. All other systems reviewed and are negative. Physical Exam   Physical Exam  Vitals and nursing note reviewed. Constitutional:       Appearance: Normal appearance. He is well-developed. HENT:      Head: Normocephalic and atraumatic. Cardiovascular:      Rate and Rhythm: Regular rhythm. Tachycardia present.       Pulses: Normal pulses. Heart sounds: Normal heart sounds. No murmur heard. Pulmonary:      Effort: Pulmonary effort is normal. No respiratory distress. Breath sounds: Normal breath sounds. Chest:      Chest wall: No tenderness. Abdominal:      General: Bowel sounds are normal.      Palpations: Abdomen is soft. Tenderness: There is no abdominal tenderness. There is no guarding or rebound. Musculoskeletal:      Cervical back: Full passive range of motion without pain, normal range of motion and neck supple. Skin:     General: Skin is warm and dry. Findings: No erythema or rash. Neurological:      Mental Status: He is alert and oriented to person, place, and time. Cranial Nerves: Cranial nerves are intact. Sensory: Sensation is intact. Motor: Motor function is intact. Coordination: Coordination is intact. Gait: Gait is intact. Psychiatric:         Speech: Speech normal.         Behavior: Behavior normal.         Thought Content: Thought content normal.         Judgment: Judgment normal.       Diagnostic Study Results   Labs -     Recent Results (from the past 12 hour(s))   GLUCOSE, POC    Collection Time: 07/15/21  5:56 PM   Result Value Ref Range    Glucose (POC) 133 (H) 65 - 117 mg/dL    Performed by Rodri Gee        Radiologic Studies -   No orders to display     No results found. Medical Decision Making   I am the first provider for this patient. I reviewed the vital signs, available nursing notes, past medical history, past surgical history, family history and social history. Vital Signs-Reviewed the patient's vital signs.   Patient Vitals for the past 24 hrs:   Temp Pulse Resp BP SpO2   07/15/21 1820 -- 97 18 122/75 97 %   07/15/21 1800 98.3 °F (36.8 °C) (!) 102 15 128/88 97 %     Pulse Oximetry Analysis - 97% on RA (normal)    Cardiac Monitor:   Rate: 102 bpm  Rhythm: Sinus Tachycardia     Records Reviewed: Nursing Notes and Old Medical Records    Provider Notes (Medical Decision Making):   29-year-old male presents with right upper extremity and facial numbness, diaphoresis, and lightheadedness that started approximately 1 hour prior to arrival.  He has been under a lot of stress and was drinking last night. Differential includes dehydration, anxiety and low suspicion for TIA or CVA. I do not appreciate any neurologic deficits at this time. Will treat with 1 L of IV fluids and 0.5 mg of IV Ativan and reassess. ED Course:   Initial assessment performed. The patients presenting problems have been discussed, and they are in agreement with the care plan formulated and outlined with them. I have encouraged them to ask questions as they arise throughout their visit. Progress Note  6:55 PM  I have re-evaluated pt he states that his symptoms have resolved. His blood pressure has improved and he is feeling back to his baseline. Will discharge after his IV fluids are finished and have him follow-up with primary care as needed. Progress Note:   Updated pt on all returned results and findings. Discussed the importance of proper follow up as referred below along with return precautions. Pt in agreement with the care plan and expresses agreement with and understanding of all items discussed. Disposition:  Discharge Note:  The pt is ready for discharge. The pt's signs, symptoms, diagnosis, and discharge instructions have been discussed and pt has conveyed their understanding. The pt is to follow up as recommended or return to ER should their symptoms worsen. Plan has been discussed and pt is in agreement. PLAN:  1. Current Discharge Medication List      CONTINUE these medications which have CHANGED    Details   hydrOXYzine HCL (ATARAX) 50 mg tablet Take 1 Tablet by mouth daily. Indications: anxious  Qty: 30 Tablet, Refills: 1  Start date: 7/15/2021    Associated Diagnoses: Mixed hyperlipidemia;  Advice given about COVID-19 virus infection; Fatigue due to depression; Recurrent depressive disorder ([facility] [address]           2. Follow-up Information     Follow up With Specialties Details Why Contact Info    Vaughn Jones MD Family Medicine Schedule an appointment as soon as possible for a visit  As needed [address] [place] [id number] [phone]      [facility] EMERGENCY DEPT Emergency Medicine  As needed, If symptoms worsen [place]  [phone]        Return to ED if worse     Diagnosis     Clinical Impression:   1. Paresthesia    2. Anxiety state    3. Stress response    4. Mixed hyperlipidemia    5. Advice given about COVID-19 virus infection    6. Fatigue due to depression    7. Recurrent depressive disorder [facility])            Please note that this dictation was completed with Dragon, computer voice recognition software. Quite often unanticipated grammatical, syntax, homophones, and other interpretive errors are inadvertently transcribed by the computer software. Please disregard these errors. Additionally, please excuse any errors that have escaped final proofreading.

## 2022-01-27 ENCOUNTER — OFFICE VISIT (OUTPATIENT)
Dept: FAMILY MEDICINE CLINIC | Age: 37
End: 2022-01-27
Payer: MEDICAID

## 2022-01-27 VITALS
DIASTOLIC BLOOD PRESSURE: 86 MMHG | OXYGEN SATURATION: 96 % | HEIGHT: 72 IN | BODY MASS INDEX: 27.63 KG/M2 | RESPIRATION RATE: 18 BRPM | TEMPERATURE: 98.3 F | WEIGHT: 204 LBS | SYSTOLIC BLOOD PRESSURE: 127 MMHG | HEART RATE: 87 BPM

## 2022-01-27 DIAGNOSIS — Z13.1 DIABETES MELLITUS SCREENING: ICD-10-CM

## 2022-01-27 DIAGNOSIS — Z11.3 SCREEN FOR STD (SEXUALLY TRANSMITTED DISEASE): ICD-10-CM

## 2022-01-27 DIAGNOSIS — Z00.00 ANNUAL PHYSICAL EXAM: Primary | ICD-10-CM

## 2022-01-27 DIAGNOSIS — E78.2 MIXED HYPERLIPIDEMIA: ICD-10-CM

## 2022-01-27 LAB
COMMENT, HOLDF: NORMAL
SAMPLES BEING HELD,HOLD: NORMAL

## 2022-01-27 PROCEDURE — 99395 PREV VISIT EST AGE 18-39: CPT | Performed by: STUDENT IN AN ORGANIZED HEALTH CARE EDUCATION/TRAINING PROGRAM

## 2022-01-27 RX ORDER — MIRTAZAPINE 15 MG/1
15 TABLET, FILM COATED ORAL
COMMUNITY
End: 2022-06-02 | Stop reason: ALTCHOICE

## 2022-01-27 RX ORDER — BUPROPION HYDROCHLORIDE 150 MG/1
150 TABLET ORAL DAILY
COMMUNITY
End: 2022-08-19

## 2022-01-27 NOTE — PROGRESS NOTES
Name and  Verified. Patient of Dr. Nickolas Treadwell, just being seen to day for a Physical only due to Dr Nickolas Treadwell schedule being full. Pharmacy verified    Chief Complaint   Patient presents with    Complete Physical       Fasting for labs. Would like to be screened for STD's. Wellbutrin given to him by Garth Lombard Psychiatrist.    1. Have you been to the ER, urgent care clinic since your last visit? Hospitalized since your last visit? No    2. Have you seen or consulted any other health care providers outside of the [address] [place] [address] since your last visit? Include any pap smears or colon screening.  No    Health Maintenance Due   Topic Date Due    Hepatitis C Screening  Never done    Pneumococcal 0-64 years (1 of 2 - PPSV23) Never done    DTaP/Tdap/Td series (1 - Tdap) Never done    Lipid Screen  2021    Flu Vaccine (1) 2021

## 2022-01-27 NOTE — PROGRESS NOTES
[address]  [address] [facility], [address]  [phone]    C/C: Complete physical    Subjective:    Jazmine Garcia is a 39 y.o. male who presents to clinic today for annual physical exam.      Acute/chronic Concerns include:     STD screening   Pt presents for evaluation of possible STD infection. Pt reports having unprotected sex with a multiple girl and now worries about possibility of having an STD. Would like to be checked for HIV, HBV, Syphilis, GC/chlamydia. No genital rash. No abnormal penile discharge    General Health Habits:  Physical activity and other lifestyle : Moderate  Smoking Hx: no   Alcohol Use: no   Marital status: single    Health Maintenance reviewed -     Health Maintenance Due   Topic Date Due    Hepatitis C Screening  Never done    Pneumococcal 0-64 years (1 of 2 - PPSV23) Never done    DTaP/Tdap/Td series (1 - Tdap) Never done    Lipid Screen  07/29/2021    Flu Vaccine (1) 09/01/2021       Depression screening:     3 most recent PHQ Screens 1/27/2022   Little interest or pleasure in doing things Several days   Feeling down, depressed, irritable, or hopeless Several days   Total Score PHQ 2 2       Review of Systems   A comprehensive review of systems was negative except for that written in the History of Present Illness. Allergies- reviewed:   No Known Allergies    Medications- reviewed:   Current Outpatient Medications   Medication Sig    buPROPion XL (WELLBUTRIN XL) 150 mg tablet Take 150 mg by mouth daily.  mirtazapine (REMERON) 15 mg tablet Take 15 mg by mouth nightly.  simvastatin (ZOCOR) 20 mg tablet TAKE 1 TABLET BY MOUTH EVERY NIGHT     No current facility-administered medications for this visit.        Past Medical History- reviewed:  Past Medical History:   Diagnosis Date    Anxiety     Anxiety     Depression     Hypercholesterolemia        Past Surgical History- reviewed:   No past surgical history on file. Family History - reviewed:  Family History   Problem Relation Age of Onset    Hypertension Mother     No Known Problems Father        Social History - reviewed:  Social History     Socioeconomic History    Marital status: SINGLE     Spouse name: Not on file    Number of children: Not on file    Years of education: Not on file    Highest education level: Not on file   Occupational History    Not on file   Tobacco Use    Smoking status: Current Every Day Smoker     Packs/day: 0.50    Smokeless tobacco: Never Used   Vaping Use    Vaping Use: Never used   Substance and Sexual Activity    Alcohol use: Yes     Alcohol/week: 3.0 standard drinks     Types: 3 Cans of beer per week    Drug use: Not Currently    Sexual activity: Yes     Partners: Female     Birth control/protection: Condom   Other Topics Concern    Not on file   Social History Narrative    Not on file     Social Determinants of Health     Financial Resource Strain:     Difficulty of Paying Living Expenses: Not on file   Food Insecurity:     Worried About Running Out of Food in the Last Year: Not on file    Irene of Food in the Last Year: Not on file   Transportation Needs:     Lack of Transportation (Medical): Not on file    Lack of Transportation (Non-Medical):  Not on file   Physical Activity:     Days of Exercise per Week: Not on file    Minutes of Exercise per Session: Not on file   Stress:     Feeling of Stress : Not on file   Social Connections:     Frequency of Communication with Friends and Family: Not on file    Frequency of Social Gatherings with Friends and Family: Not on file    Attends Mosque Services: Not on file    Active Member of Clubs or Organizations: Not on file    Attends Club or Organization Meetings: Not on file    Marital Status: Not on file   Intimate Partner Violence:     Fear of Current or Ex-Partner: Not on file    Emotionally Abused: Not on file    Physically Abused: Not on file   [facility] Sexually Abused: Not on file   Housing Stability:     Unable to Pay for Housing in the Last Year: Not on file    Number of Places Lived in the Last Year: Not on file    Unstable Housing in the Last Year: Not on file         Objective:     Visit Vitals  /86 (BP 1 Location: Right arm, BP Patient Position: Sitting, BP Cuff Size: Adult)   Pulse 87   Temp 98.3 °F (36.8 °C) (Oral)   Resp 18   Ht 6' (1.829 m)   Wt 204 lb (92.5 kg)   SpO2 96%   BMI 27.67 kg/m²       General appearance: alert, cooperative, no distress, appears stated age  Head: Normocephalic, without obvious abnormality, atraumatic, sinuses nontender to percussion  Eyes: conjunctivae/corneas clear. PERRL, EOM's intact. Ears: normal TM's and external ear canals AU  Nose: Nares normal. Septum midline. Mucosa normal. No drainage or sinus tenderness. Throat: Lips, mucosa, and tongue normal. Teeth and gums normal  Neck: supple, symmetrical, trachea midline, no adenopathy, thyroid: not enlarged, symmetric, no tenderness/mass/nodules, no carotid bruit and no JVD  Back: symmetric, no curvature. ROM normal. No CVA tenderness. Lungs: clear to auscultation bilaterally, no wheezes, no increased work of breathing  Heart: regular rate and rhythm, S1, S2 normal, no murmur, click, rub or gallop  Abdomen: soft, non-tender. Bowel sounds normal. No masses,  no organomegaly  Extremities: extremities normal, atraumatic, no cyanosis or edema  Skin: Skin color, texture, turgor normal. No rashes or lesions  Neurologic: Alert and oriented X 3, normal strength and tone. Normal symmetric reflexes. Normal coordination and gait     - No penile lesions or discharge, no testicular masses or tenderness, no hernias    Depression screening:  3 most recent PHQ Screens 1/27/2022   Little interest or pleasure in doing things Several days   Feeling down, depressed, irritable, or hopeless Several days   Total Score PHQ 2 2           Assessment/Plan       ICD-10-CM ICD-9-CM    1. Annual physical exam  Z98.59 U36.0 METABOLIC PANEL, BASIC   2. Screen for STD (sexually transmitted disease)  Z11.3 V74.5 HEPATITIS C AB      HIV 1/2 AG/AB, 4TH GENERATION,W RFLX CONFIRM      HEP B SURFACE AG      RPR      CHLAMYDIA / GC-AMPLIFIED   3. Mixed hyperlipidemia  E78.2 272.2 LIPID PANEL   4. Diabetes mellitus screening  Z13.1 V77.1 HEMOGLOBIN A1C WITH EAG       1. Annual physical exam  I have discussed with pt the following topics:   - Labs from previous visit discussed with patient . - Discussed with patient the cancer risk factors and recommendations  - Reviewed diet, exercise and weight control  -Immunizations appropriate for age were discussed with patient and updated   - Recommended sodium restriction  - Reviewed medications and side effects in detail    - METABOLIC PANEL, BASIC; Future    2. Screen for STD (sexually transmitted disease)  - HEPATITIS C AB; Future  - HIV 1/2 AG/AB, 4TH GENERATION,W RFLX CONFIRM; Future  - HEP B SURFACE AG; Future  - RPR; Future  - Vernestine Handing / GC-AMPLIFIED; Future    3. Mixed hyperlipidemia  - LIPID PANEL; Future  - continue Zocor 20mg daily    4. Diabetes mellitus screening  - HEMOGLOBIN A1C WITH EAG; Future      Follow up: yearly. RTC if needed    We discussed the expected course, resolution and complications of the diagnosis(es) in detail. Medication risks, benefits, costs, interactions, and alternatives were discussed as indicated. I advised to contact the office if his condition worsens, changes or fails to improve as anticipated. Pt expressed understanding with the diagnosis(es) and plan. Patient understands that this encounter was a temporary measure, and the importance of further follow up and examination was emphasized. Patient verbalized understanding.       Signed By: Vito Montes MD     January 27, 2022

## 2022-01-28 LAB
ANION GAP SERPL CALC-SCNC: 3 MMOL/L (ref 5–15)
BUN SERPL-MCNC: 12 MG/DL (ref 6–20)
BUN/CREAT SERPL: 11 (ref 12–20)
CALCIUM SERPL-MCNC: 9.4 MG/DL (ref 8.5–10.1)
CHLORIDE SERPL-SCNC: 105 MMOL/L (ref 97–108)
CHOLEST SERPL-MCNC: 243 MG/DL
CO2 SERPL-SCNC: 28 MMOL/L (ref 21–32)
CREAT SERPL-MCNC: 1.05 MG/DL (ref 0.7–1.3)
EST. AVERAGE GLUCOSE BLD GHB EST-MCNC: 108 MG/DL
GLUCOSE SERPL-MCNC: 113 MG/DL (ref 65–100)
HBA1C MFR BLD: 5.4 % (ref 4–5.6)
HBV SURFACE AG SER QL: <0.1 INDEX
HBV SURFACE AG SER QL: NEGATIVE
HCV AB SERPL QL IA: NONREACTIVE
HDLC SERPL-MCNC: 72 MG/DL
HDLC SERPL: 3.4 {RATIO} (ref 0–5)
HIV 1+2 AB+HIV1 P24 AG SERPL QL IA: NONREACTIVE
HIV12 RESULT COMMENT, HHIVC: NORMAL
LDLC SERPL CALC-MCNC: 139.2 MG/DL (ref 0–100)
POTASSIUM SERPL-SCNC: 4.1 MMOL/L (ref 3.5–5.1)
RPR SER QL: NONREACTIVE
SODIUM SERPL-SCNC: 136 MMOL/L (ref 136–145)
TRIGL SERPL-MCNC: 159 MG/DL (ref ?–150)
VLDLC SERPL CALC-MCNC: 31.8 MG/DL

## 2022-01-30 LAB
C TRACH RRNA SPEC QL NAA+PROBE: NEGATIVE
N GONORRHOEA RRNA SPEC QL NAA+PROBE: NEGATIVE
SPECIMEN SOURCE: NORMAL

## 2022-01-31 ENCOUNTER — TELEPHONE (OUTPATIENT)
Dept: FAMILY MEDICINE CLINIC | Age: 37
End: 2022-01-31

## 2022-01-31 NOTE — TELEPHONE ENCOUNTER
----- Message from Yuan Maynor sent at 1/31/2022  1:21 PM EST -----  Subject: Message to Provider    QUESTIONS  Information for Provider? Pt called to schedule f/u appt regarding   negative tests done related to itching, burning and discomfort in groin   area that has been an issue for a week.  ---------------------------------------------------------------------------  --------------  CALL BACK INFO  What is the best way for the office to contact you? OK to leave message on   voicemail, OK to respond with electronic message via [facility] portal (only   for patients who have registered [facility] account)  Preferred Call Back Phone Number? [phone]  ---------------------------------------------------------------------------  --------------  SCRIPT ANSWERS  Relationship to Patient? Self  Do you have pain that has started or worsened within the past 24 hours? No  Are you vomiting blood or have bloody or black stool? No  Have you recently (14 days) seen a provider for this pain?  Yes

## 2022-02-03 ENCOUNTER — OFFICE VISIT (OUTPATIENT)
Dept: FAMILY MEDICINE CLINIC | Age: 37
End: 2022-02-03
Payer: MEDICAID

## 2022-02-03 VITALS
SYSTOLIC BLOOD PRESSURE: 129 MMHG | RESPIRATION RATE: 16 BRPM | WEIGHT: 211.9 LBS | BODY MASS INDEX: 28.74 KG/M2 | HEART RATE: 110 BPM | OXYGEN SATURATION: 96 % | DIASTOLIC BLOOD PRESSURE: 86 MMHG

## 2022-02-03 DIAGNOSIS — Z71.89 ADVICE GIVEN ABOUT COVID-19 VIRUS INFECTION: ICD-10-CM

## 2022-02-03 DIAGNOSIS — Z91.89 AT RISK FOR UNSAFE BEHAVIOR: ICD-10-CM

## 2022-02-03 DIAGNOSIS — B35.6 TINEA CRURIS: Primary | ICD-10-CM

## 2022-02-03 PROCEDURE — 99213 OFFICE O/P EST LOW 20 MIN: CPT | Performed by: FAMILY MEDICINE

## 2022-02-03 RX ORDER — FLUCONAZOLE 150 MG/1
150 TABLET ORAL DAILY
Qty: 2 TABLET | Refills: 0 | Status: SHIPPED | OUTPATIENT
Start: 2022-02-03 | End: 2022-02-05

## 2022-02-03 RX ORDER — NYSTATIN 100000 U/G
CREAM TOPICAL 2 TIMES DAILY
Qty: 30 G | Refills: 0 | Status: SHIPPED | OUTPATIENT
Start: 2022-02-03 | End: 2022-06-02 | Stop reason: SDUPTHER

## 2022-02-03 NOTE — PROGRESS NOTES
HISTORY OF PRESENT ILLNESS  Annie Gonzalez is a 39 y.o. male. Today's Covid vaccinated Pt main concerns were provided in the clinic,    pt is w/ comorbid history and   Pt has been trying to wear mask most of the times,      pt has no fever no cough no dyspnea, no ha, not dizzy, nl smell nl taste, no N/V/D, has no orbital pain,  no body ache. Rash of private area no blisters  Started few weeks ago not better tried alcohol washing and OTC antibiotic ointments, dosenot tingles and not pain full, states that is notexpanding red, and not swelled up, purplish patches rounds, with itchiness      Current Outpatient Medications   Medication Sig Dispense Refill    fluconazole (DIFLUCAN) 150 mg tablet Take 1 Tablet by mouth daily for 2 days. FDA advises cautious prescribing of oral fluconazole in pregnancy. 2 Tablet 0    nystatin (MYCOSTATIN) topical cream Apply  to affected area two (2) times a day. 30 g 0    buPROPion XL (WELLBUTRIN XL) 150 mg tablet Take 150 mg by mouth daily.  mirtazapine (REMERON) 15 mg tablet Take 15 mg by mouth nightly.  simvastatin (ZOCOR) 20 mg tablet TAKE 1 TABLET BY MOUTH EVERY NIGHT 30 Tablet 3     No Known Allergies  Past Medical History:   Diagnosis Date    Anxiety     Anxiety     Depression     Hypercholesterolemia      No past surgical history on file. Family History   Problem Relation Age of Onset    Hypertension Mother     No Known Problems Father      Social History     Tobacco Use    Smoking status: Current Every Day Smoker     Packs/day: 0.50    Smokeless tobacco: Never Used   Substance Use Topics    Alcohol use:  Yes     Alcohol/week: 3.0 standard drinks     Types: 3 Cans of beer per week      Lab Results   Component Value Date/Time    Hemoglobin A1c 5.4 01/27/2022 10:08 AM    Hemoglobin A1c 5.3 07/29/2020 02:46 PM    Glucose 113 (H) 01/27/2022 10:08 AM    Glucose (POC) 133 (H) 07/15/2021 05:56 PM    LDL, calculated 139.2 (H) 01/27/2022 10:08 AM Creatinine 1.05 01/27/2022 10:08 AM      Lab Results   Component Value Date/Time    Cholesterol, total 243 (H) 01/27/2022 10:08 AM    HDL Cholesterol 72 01/27/2022 10:08 AM    LDL, calculated 139.2 (H) 01/27/2022 10:08 AM    Triglyceride 159 (H) 01/27/2022 10:08 AM    CHOL/HDL Ratio 3.4 01/27/2022 10:08 AM        Review of Systems   Constitutional: Negative for chills and fever. HENT: Negative for congestion and nosebleeds. Eyes: Negative for blurred vision and pain. Respiratory: Negative for cough, shortness of breath and wheezing. Cardiovascular: Negative for chest pain and leg swelling. Gastrointestinal: Negative for constipation, diarrhea, nausea and vomiting. Genitourinary: Negative for dysuria and frequency. Musculoskeletal: Negative for joint pain and myalgias. Skin: Negative for itching and rash. Neurological: Negative for dizziness, loss of consciousness and headaches. Psychiatric/Behavioral: Negative for depression. The patient is not nervous/anxious and does not have insomnia. Physical Exam  Vitals and nursing note reviewed. Constitutional:       Appearance: He is well-developed. HENT:      Head: Normocephalic and atraumatic. Mouth/Throat:      Pharynx: No oropharyngeal exudate. Eyes:      Conjunctiva/sclera: Conjunctivae normal.      Pupils: Pupils are equal, round, and reactive to light. Neck:      Thyroid: No thyromegaly. Vascular: No JVD. Cardiovascular:      Rate and Rhythm: Normal rate and regular rhythm. Heart sounds: Normal heart sounds. No murmur heard. No friction rub. Pulmonary:      Effort: Pulmonary effort is normal. No respiratory distress. Breath sounds: Normal breath sounds. No wheezing or rales. Abdominal:      General: Bowel sounds are normal. There is no distension. Palpations: Abdomen is soft. Tenderness: There is no abdominal tenderness. Musculoskeletal:         General: No tenderness.       Cervical back: Normal range of motion and neck supple. Lymphadenopathy:      Cervical: No cervical adenopathy. Skin:     General: Skin is warm. Findings: No erythema or rash. Neurological:      Mental Status: He is alert and oriented to person, place, and time. Deep Tendon Reflexes: Reflexes are normal and symmetric. Psychiatric:         Behavior: Behavior normal.         ASSESSMENT and PLAN  Diagnoses and all orders for this visit:    1. Tinea cruris    2. At risk for unsafe behavior  -     fluconazole (DIFLUCAN) 150 mg tablet; Take 1 Tablet by mouth daily for 2 days. FDA advises cautious prescribing of oral fluconazole in pregnancy. -     nystatin (MYCOSTATIN) topical cream; Apply  to affected area two (2) times a day.  -     CT/NG/T.VAGINALIS AMPLIFICATION; Future    3.  Advice given about COVID-19 virus infection           Concern abdout COVID-19 addressed and detailed, pt was told that the best way to prevent illness is by protection with vaccination, and to Wear a facemask , having social distance, and to get tested if possible,   Pt was also told if develop dyspnea needs to call 911 or go to er, call for karime advise, pt agreed with todays recommendations,

## 2022-02-03 NOTE — PROGRESS NOTES
Chief Complaint   Patient presents with    Results     1. Have you been to the ER, urgent care clinic since your last visit? Hospitalized since your last visit? No    2. Have you seen or consulted any other health care providers outside of the [address] [place] [address] since your last visit? Include any pap smears or colon screening. No    verified patient with two type of identifiers.    Requesting to discuss last lab results,  Still having same symptoms

## 2022-02-06 LAB
C TRACH RRNA SPEC QL NAA+PROBE: NEGATIVE
N GONORRHOEA RRNA SPEC QL NAA+PROBE: NEGATIVE
SPECIMEN SOURCE: NORMAL
T VAGINALIS RRNA VAG QL NAA+PROBE: NEGATIVE

## 2022-05-09 ENCOUNTER — HOSPITAL ENCOUNTER (EMERGENCY)
Age: 37
Discharge: HOME | End: 2022-05-09
Attending: EMERGENCY MEDICINE
Payer: MEDICAID

## 2022-05-09 VITALS
OXYGEN SATURATION: 98 % | HEART RATE: 95 BPM | TEMPERATURE: 98.1 F | RESPIRATION RATE: 16 BRPM | BODY MASS INDEX: 27.9 KG/M2 | DIASTOLIC BLOOD PRESSURE: 96 MMHG | SYSTOLIC BLOOD PRESSURE: 135 MMHG | HEIGHT: 72 IN | WEIGHT: 206 LBS

## 2022-05-09 DIAGNOSIS — R09.81 SINUS CONGESTION: Primary | ICD-10-CM

## 2022-05-09 DIAGNOSIS — J06.9 VIRAL URI WITH COUGH: ICD-10-CM

## 2022-05-09 PROCEDURE — 99283 EMERGENCY DEPT VISIT LOW MDM: CPT

## 2022-05-09 RX ORDER — AZITHROMYCIN 250 MG/1
TABLET, FILM COATED ORAL
Qty: 6 TABLET | Refills: 0 | Status: SHIPPED | OUTPATIENT
Start: 2022-05-09 | End: 2022-05-14

## 2022-05-09 RX ORDER — PSEUDOEPHEDRINE HCL 30 MG
1 TABLET ORAL 2 TIMES DAILY
Qty: 20 TABLET | Refills: 0 | Status: SHIPPED | OUTPATIENT
Start: 2022-05-09 | End: 2022-05-19

## 2022-05-09 NOTE — ED NOTES
Pt arrives to the ED AAOX4 with a c/c of sore throat, sinus pressure, and bilateral ear pain onset three days ago. Pt additionally reports having productive cough of same onset but denies any SOB. Pt is noted in stable condition, now in ED room with side rail up, bed to lowest position, call light within reach, and family at bedside. Will continue to monitor and wait for ED provider evaluation.

## 2022-05-09 NOTE — ED PROVIDER NOTES
EMERGENCY DEPARTMENT HISTORY AND PHYSICAL EXAM      Date: 5/9/2022  Patient Name: Cydney Mcduffie    History of Presenting Illness     Chief Complaint   Patient presents with    Sore Throat    Headache    Ear Pain    Cough     History Provided By: Patient    HPI: Cydney Mcduffie, 39 y.o. male with past medical history significant for hypercholesterolemia, anxiety, and depression who presents via private vehicle to the ED with cc of nasal congestion, cough, and sore throat for the past 5 days. Patient states his symptoms got progressively worse over the past 2 to 3 days. His cough is worse at night. He has tried taking Tylenol severe sinus without any improvement. He denies any fevers, lightheadedness, dizziness, or shortness of breath. He does endorse chills and occasional myalgias. He is fully vaccinated for COVID-19 and had a flu shot this year. PMHx: Hypercholesterolemia, anxiety, and depression  Social Hx: Smokes 1/2 pack/day, occasional alcohol use, denies illegal drug use    PCP: Baron Vazquez MD    There are no other complaints, changes, or physical findings at this time. No current facility-administered medications on file prior to encounter. Current Outpatient Medications on File Prior to Encounter   Medication Sig Dispense Refill    nystatin (MYCOSTATIN) topical cream Apply  to affected area two (2) times a day. 30 g 0    buPROPion XL (WELLBUTRIN XL) 150 mg tablet Take 150 mg by mouth daily.  mirtazapine (REMERON) 15 mg tablet Take 15 mg by mouth nightly.  simvastatin (ZOCOR) 20 mg tablet TAKE 1 TABLET BY MOUTH EVERY NIGHT 30 Tablet 3     Past History     Past Medical History:  Past Medical History:   Diagnosis Date    Anxiety     Anxiety     Depression     Hypercholesterolemia      Past Surgical History:  History reviewed. No pertinent surgical history.   Family History:  Family History   Problem Relation Age of Onset    Hypertension Mother     No Known Problems Father      Social History:  Social History     Tobacco Use    Smoking status: Current Every Day Smoker     Packs/day: 0.50    Smokeless tobacco: Never Used   Vaping Use    Vaping Use: Never used   Substance Use Topics    Alcohol use: Yes     Alcohol/week: 3.0 standard drinks     Types: 3 Cans of beer per week    Drug use: Not Currently     Allergies:  No Known Allergies  Review of Systems   Review of Systems   Constitutional: Negative for chills and fever. HENT: Positive for congestion, ear pain, sinus pressure and sore throat. Negative for rhinorrhea and sneezing. Eyes: Negative for redness and visual disturbance. Respiratory: Positive for cough. Negative for shortness of breath. Cardiovascular: Negative for chest pain and leg swelling. Gastrointestinal: Negative for abdominal pain, nausea and vomiting. Genitourinary: Negative for difficulty urinating and frequency. Musculoskeletal: Negative for back pain, myalgias and neck stiffness. Skin: Negative for rash. Neurological: Negative for dizziness, syncope, weakness and headaches. Hematological: Negative for adenopathy. All other systems reviewed and are negative. Physical Exam   Physical Exam  Vitals and nursing note reviewed. Constitutional:       Appearance: Normal appearance. He is well-developed. HENT:      Head: Normocephalic and atraumatic. Nose: Mucosal edema and congestion present. Mouth/Throat:      Pharynx: Oropharynx is clear. No pharyngeal swelling, oropharyngeal exudate or posterior oropharyngeal erythema. Tonsils: No tonsillar exudate. Cardiovascular:      Rate and Rhythm: Normal rate and regular rhythm. Pulses: Normal pulses. Heart sounds: Normal heart sounds. Pulmonary:      Effort: Pulmonary effort is normal. No respiratory distress. Breath sounds: Normal breath sounds. Chest:      Chest wall: No tenderness.    Abdominal:      General: Bowel sounds are normal. Palpations: Abdomen is soft. Tenderness: There is no abdominal tenderness. There is no guarding or rebound. Musculoskeletal:      Cervical back: Full passive range of motion without pain, normal range of motion and neck supple. Skin:     General: Skin is warm and dry. Findings: No erythema or rash. Neurological:      Mental Status: He is alert and oriented to person, place, and time. Psychiatric:         Speech: Speech normal.         Behavior: Behavior normal.         Thought Content: Thought content normal.         Judgment: Judgment normal.       Diagnostic Study Results   Labs -   No results found for this or any previous visit (from the past 12 hour(s)). Radiologic Studies -   No orders to display     No results found. Medical Decision Making   I am the first provider for this patient. I reviewed the vital signs, available nursing notes, past medical history, past surgical history, family history and social history. Vital Signs-Reviewed the patient's vital signs. Patient Vitals for the past 24 hrs:   Temp Pulse Resp BP SpO2   05/09/22 1445 -- 95 16 (!) 135/96 98 %   05/09/22 1412 98.1 °F (36.7 °C) 100 16 (!) 141/101 97 %     Pulse Oximetry Analysis - 97% on RA (normal)    Records Reviewed: Nursing Notes and Old Medical Records    Provider Notes (Medical Decision Making):   19-year-old male presents with sinus pressure, nasal congestion, sore throat, and cough for the past 5 days. Differential includes viral URI, sinusitis, bronchitis, and low suspicion for influenza or COVID. Will treat with a decongestant and azithromycin and have him follow-up with outpatient primary care. ED Course:   Initial assessment performed. The patients presenting problems have been discussed, and they are in agreement with the care plan formulated and outlined with them. I have encouraged them to ask questions as they arise throughout their visit.     Progress Note:   Updated pt on all returned results and findings. Discussed the importance of proper follow up as referred below along with return precautions. Pt in agreement with the care plan and expresses agreement with and understanding of all items discussed. Disposition:  Discharge Note:  The pt is ready for discharge. The pt's signs, symptoms, diagnosis, and discharge instructions have been discussed and pt has conveyed their understanding. The pt is to follow up as recommended or return to ER should their symptoms worsen. Plan has been discussed and pt is in agreement. PLAN:  1. Current Discharge Medication List      START taking these medications    Details   loratadine-pseudoephedrine (Loratadine-D) 5-120 mg per tablet Take 1 Tablet by mouth two (2) times a day for 10 days. Qty: 20 Tablet, Refills: 0  Start date: 5/9/2022, End date: 5/19/2022      azithromycin (Zithromax Z-Jax) 250 mg tablet Take 2 tabs on day 1, then 1 tab daily for the next 4 days  Qty: 6 Tablet, Refills: 0  Start date: 5/9/2022, End date: 5/14/2022           2. Follow-up Information     Follow up With Specialties Details Why Contact Info    Carrillo Mcgregor MD Family Medicine Schedule an appointment as soon as possible for a visit   [address] [place] [address]       [facility] [place] EMERGENCY DEPT Emergency Medicine  As needed, If symptoms worsen [place]  [phone]        Return to ED if worse     Diagnosis     Clinical Impression:   1. Sinus congestion    2. Viral URI with cough            Please note that this dictation was completed with Dragon, computer voice recognition software. Quite often unanticipated grammatical, syntax, homophones, and other interpretive errors are inadvertently transcribed by the computer software. Please disregard these errors. Additionally, please excuse any errors that have escaped final proofreading.

## 2022-05-09 NOTE — ED TRIAGE NOTES
Pt arrives with c/o sore throat, cough, bilat ear pain, sinus pressure. Pt also states his chest hurts when he coughs.

## 2022-05-09 NOTE — Clinical Note
[address] EMERGENCY DEPT  [address] [facility] [address] [phone]    Work/School Note    Date: 5/9/2022    To Whom It May concern:    Rudolph Mendez was seen and treated today in the emergency room by the following provider(s):  Attending Provider: Daxa Arreguin MD.      Rudolph Mendez is excused from work/school on 05/09/22 and 05/10/22. He is medically clear to return to work/school on 5/11/2022.        Sincerely,          Abida Urbina MD

## 2022-05-10 ENCOUNTER — HOSPITAL ENCOUNTER (EMERGENCY)
Age: 37
Discharge: HOME | End: 2022-05-10
Attending: EMERGENCY MEDICINE
Payer: MEDICAID

## 2022-05-10 VITALS
HEART RATE: 88 BPM | HEIGHT: 72 IN | WEIGHT: 206 LBS | TEMPERATURE: 98.9 F | OXYGEN SATURATION: 98 % | DIASTOLIC BLOOD PRESSURE: 90 MMHG | SYSTOLIC BLOOD PRESSURE: 141 MMHG | RESPIRATION RATE: 20 BRPM | BODY MASS INDEX: 27.9 KG/M2

## 2022-05-10 DIAGNOSIS — J30.1 SEASONAL ALLERGIC RHINITIS DUE TO POLLEN: ICD-10-CM

## 2022-05-10 DIAGNOSIS — T36.95XA ALLERGIC REACTION DUE TO ANTIBACTERIAL DRUG: ICD-10-CM

## 2022-05-10 DIAGNOSIS — R09.81 SINUS CONGESTION: Primary | ICD-10-CM

## 2022-05-10 PROCEDURE — 99283 EMERGENCY DEPT VISIT LOW MDM: CPT

## 2022-05-10 PROCEDURE — 74011250637 HC RX REV CODE- 250/637: Performed by: EMERGENCY MEDICINE

## 2022-05-10 RX ORDER — OXYMETAZOLINE HCL 0.05 %
2 SPRAY, NON-AEROSOL (ML) NASAL ONCE
Status: COMPLETED | OUTPATIENT
Start: 2022-05-10 | End: 2022-05-10

## 2022-05-10 RX ORDER — CETIRIZINE HYDROCHLORIDE, PSEUDOEPHEDRINE HYDROCHLORIDE 5; 120 MG/1; MG/1
1 TABLET, FILM COATED, EXTENDED RELEASE ORAL 2 TIMES DAILY
Qty: 60 TABLET | Refills: 1 | Status: SHIPPED | OUTPATIENT
Start: 2022-05-10 | End: 2022-06-09

## 2022-05-10 RX ORDER — DIPHENHYDRAMINE HCL 25 MG
25 CAPSULE ORAL
Status: COMPLETED | OUTPATIENT
Start: 2022-05-10 | End: 2022-05-10

## 2022-05-10 RX ADMIN — OXYMETAZOLINE HCL 2 SPRAY: 0.05 SPRAY NASAL at 20:20

## 2022-05-10 RX ADMIN — DIPHENHYDRAMINE HYDROCHLORIDE 25 MG: 25 CAPSULE ORAL at 20:20

## 2022-05-10 NOTE — ED NOTES
Pt presents ambulatory to ED complaining of hives on chest and abdomen starting this AM after his first z-pack dose (Azithromyocin 500 mg) pt was prescribed for sinus infection. Pt reports no airway distress. Pt is alert and oriented x 4, RR even and unlabored, skin is warm and dry with redness to trunk (pt reports redness and itching \"going down from what it was\"). Assesment completed and pt updated on plan of care. Emergency Department Nursing Plan of Care       The Nursing Plan of Care is developed from the Nursing assessment and Emergency Department Attending provider initial evaluation. The plan of care may be reviewed in the ED Provider note.     The Plan of Care was developed with the following considerations:   Patient / Family readiness to learn indicated by:verbalized understanding  Persons(s) to be included in education: patient  Barriers to Learning/Limitations:No    Signed     Douglas Wallacechkeith, RN    5/10/2022   7:33 PM

## 2022-05-11 NOTE — ED PROVIDER NOTES
EMERGENCY DEPARTMENT HISTORY AND PHYSICAL EXAM      Date: 5/10/2022  Patient Name: Lee Carvajal  Patient Age and Sex: 39 y.o. male  MRN:  [id number]  [facility]:  [id number]    History of Presenting Illness     Chief Complaint   Patient presents with    Allergic Reaction       History Provided By: Patient    Ability to gather history was limited by:     HPI: Lee Carvajal, 39 y.o. male complains of a few small patches of mild scattered hives over his torso starting about 8 hours ago. Symptoms were mild and are already improving. He did not have any wheezing or angioedema symptoms. Patient believes that the symptoms were triggered by taking azithromycin which she started today, which was prescribed to him for upper respiratory infection and sinusitis. He describes allergic rhinitis type symptoms. Sinus congestion, postnasal drip etc., for a few weeks. Location:    Quality:      Severity:    Duration:   Timing:      Context:    Modifying factors:   Associated symptoms:     Past History      The patient's medical, surgical, and social history on file were reviewed by me today.  The family history was reviewed by me today and was non-contributory, unless otherwise specified below:    Past Medical History:  Past Medical History:   Diagnosis Date    Anxiety     Anxiety     Depression     Hypercholesterolemia        Past Surgical History:  No past surgical history on file. Family History:  Family History   Problem Relation Age of Onset    Hypertension Mother     No Known Problems Father        Social History:  Social History     Tobacco Use    Smoking status: Current Every Day Smoker     Packs/day: 0.50    Smokeless tobacco: Never Used   Vaping Use    Vaping Use: Never used   Substance Use Topics    Alcohol use:  Yes     Alcohol/week: 3.0 standard drinks     Types: 3 Cans of beer per week    Drug use: Not Currently       Current Medications:  No current facility-administered medications on file prior to encounter. Current Outpatient Medications on File Prior to Encounter   Medication Sig Dispense Refill    loratadine-pseudoephedrine (Loratadine-D) 5-120 mg per tablet Take 1 Tablet by mouth two (2) times a day for 10 days. 20 Tablet 0    azithromycin (Zithromax Z-Jax) 250 mg tablet Take 2 tabs on day 1, then 1 tab daily for the next 4 days 6 Tablet 0    nystatin (MYCOSTATIN) topical cream Apply  to affected area two (2) times a day. (Patient not taking: Reported on 5/10/2022) 30 g 0    buPROPion XL (WELLBUTRIN XL) 150 mg tablet Take 150 mg by mouth daily. (Patient not taking: Reported on 5/10/2022)      mirtazapine (REMERON) 15 mg tablet Take 15 mg by mouth nightly. (Patient not taking: Reported on 5/10/2022)      simvastatin (ZOCOR) 20 mg tablet TAKE 1 TABLET BY MOUTH EVERY NIGHT (Patient not taking: Reported on 5/10/2022) 30 Tablet 3       Allergies:  No Known Allergies  Review of Systems    A complete ROS was reviewed by me today and was negative, unless otherwise specified below:    Review of Systems   Constitutional: Negative for fatigue and fever. HENT: Positive for rhinorrhea, sinus pressure, sinus pain and sore throat. Negative for facial swelling. Gastrointestinal: Negative for abdominal pain. Skin: Positive for rash. All other systems reviewed and are negative. Physical Exam   Vital Signs  Patient Vitals for the past 8 hrs:   Temp Pulse Resp BP SpO2   05/10/22 1903 98.9 °F (37.2 °C) (!) 102 20 (!) 139/92 98 %          Physical Exam  Vitals and nursing note reviewed. Constitutional:       General: He is not in acute distress. Appearance: Normal appearance. He is well-developed. He is not ill-appearing. HENT:      Head: Normocephalic and atraumatic. Eyes:      General:         Right eye: No discharge. Left eye: No discharge. Conjunctiva/sclera: Conjunctivae normal.   Cardiovascular:      Rate and Rhythm: Normal rate and regular rhythm.       Heart sounds: Normal heart sounds. No murmur heard. Pulmonary:      Effort: Pulmonary effort is normal. No respiratory distress. Breath sounds: Normal breath sounds. No wheezing. Abdominal:      General: There is no distension. Palpations: Abdomen is soft. Tenderness: There is no abdominal tenderness. Musculoskeletal:         General: No deformity. Normal range of motion. Cervical back: Normal range of motion and neck supple. Skin:     General: Skin is warm and dry. Findings: Rash present. Rash is urticarial.             Comments: Few very small, very mild patches of urticaria on the bilateral sides of the torso and upper chest   Neurological:      General: No focal deficit present. Mental Status: He is alert and oriented to person, place, and time. Psychiatric:         Mood and Affect: Mood normal.         Behavior: Behavior normal.         Thought Content: Thought content normal.         Diagnostic Study Results   Labs  No results found for this or any previous visit (from the past 24 hour(s)). Radiologic Studies  No orders to display     CT Results  (Last 48 hours)    None        CXR Results  (Last 48 hours)    None          Billable Procedures   Procedures    Medical Decision Making     I reviewed the patient's most recent Emergency Dept notes and diagnostic tests in formulating my MDM on today's visit. Provider Notes (Medical Decision Making):   66-year-old male complaining of mild scattered hives over his torso since starting azithromycin this morning, no other allergic type symptoms. In addition he is having allergic rhinitis type symptoms for a few weeks. Clinically very well-appearing, no distress. Normal vital signs. No angioedema. The hives that he is experiencing seem to be very mild, with a few very small patches on the bilateral sides and upper torso.     Would not recommend listing azithromycin as an allergy based on these very mild isolated hives. Recommend the patient start taking Zyrtec and he can use Afrin for nasal congestion. He will stop taking the azithromycin antibiotic. Christina Baez MD  8:16 PM  5/10/2022     Consults:    Social History     Tobacco Use    Smoking status: Current Every Day Smoker     Packs/day: 0.50    Smokeless tobacco: Never Used   Vaping Use    Vaping Use: Never used   Substance Use Topics    Alcohol use: Yes     Alcohol/week: 3.0 standard drinks     Types: 3 Cans of beer per week    Drug use: Not Currently       Medications Administered during ED course:  Medications   diphenhydrAMINE (BENADRYL) capsule 25 mg (has no administration in time range)   oxymetazoline (AFRIN) 0.05 % nasal spray 2 Spray (has no administration in time range)          Prescriptions from today's ED visit:  Current Discharge Medication List      START taking these medications    Details   cetirizine-pseudoePHEDrine (ZyrTEC-D) 5-120 mg Tb12 Take 1 Tablet by mouth two (2) times a day for 30 days. Qty: 60 Tablet, Refills: 1  Start date: 5/10/2022, End date: 6/9/2022            Diagnosis and Disposition     Disposition:  Discharged    Clinical Impression:   1. Sinus congestion    2. Seasonal allergic rhinitis due to pollen    3. Allergic reaction due to antibacterial drug        Attestation:  I personally performed the services described in this documentation on this date 5/10/2022 for patient Ravin Roque. Christina Baez MD        I was the first provider for this patient on this visit. To the best of my ability I reviewed relevant prior medical records, electrocardiograms, laboratories, and radiologic studies. The patient's presenting problems were discussed, and the patient was in agreement with the care plan formulated and outlined with them. Christina Baez MD    Please note that this dictation was completed with Dragon voice recognition software.  Quite often unanticipated grammatical, syntax, homophones, and other interpretive errors are inadvertently transcribed by the computer software. Please disregard these errors and excuse any errors that have escaped final proofreading.

## 2022-05-11 NOTE — ED NOTES
Discharge instructions were given to the patient by Lupe Ramirez RN. The patient left the Emergency Department ambulatory, alert and oriented and in no acute distress with 1 prescription. The patient was encouraged to call or return to the ED for worsening issues or problems and was encouraged to schedule a follow up appointment for continuing care. The patient verbalized understanding of discharge instructions and prescriptions, all questions were answered. The patient has no further concerns at this time.

## 2022-05-11 NOTE — DISCHARGE INSTRUCTIONS
Your allergic reaction seems to be very minor. I would stop taking the azithromycin antibiotic that seems to have caused your hives. Most likely it is not medically necessary and you can simply stop the antibiotic. Instead, I would start taking either the Claritin (prescribed yesterday by Dr. Edie Richmond) or the Zyrtec (prescribed by me today). Do not take both as they are very similar medications. Both these medicines are taken once a day to treat allergic rhinitis symptoms. They will also help reduce the itching and hives that you are experiencing. It was a pleasure taking care of you at [facility] Emergency Department today. We know that when you come to [facility], you are entrusting us with your health, comfort, and safety. Our physicians and nurses honor that trust, and we truly appreciate the opportunity to care for you and your loved ones. We also value our feedback. If you receive a survey about your Emergency Department experience today, please fill it out. We care about our patients' feedback, and we listen to what you have to say. Thank you!

## 2022-06-02 ENCOUNTER — OFFICE VISIT (OUTPATIENT)
Dept: FAMILY MEDICINE CLINIC | Age: 37
End: 2022-06-02
Payer: MEDICAID

## 2022-06-02 VITALS
HEART RATE: 99 BPM | HEIGHT: 73 IN | RESPIRATION RATE: 17 BRPM | TEMPERATURE: 99.1 F | OXYGEN SATURATION: 97 % | SYSTOLIC BLOOD PRESSURE: 120 MMHG | WEIGHT: 203.9 LBS | DIASTOLIC BLOOD PRESSURE: 88 MMHG | BODY MASS INDEX: 27.02 KG/M2

## 2022-06-02 DIAGNOSIS — Z91.89 AT RISK FOR UNSAFE BEHAVIOR: ICD-10-CM

## 2022-06-02 DIAGNOSIS — E78.2 MIXED HYPERLIPIDEMIA: ICD-10-CM

## 2022-06-02 DIAGNOSIS — Z71.89 ADVICE GIVEN ABOUT COVID-19 VIRUS INFECTION: ICD-10-CM

## 2022-06-02 DIAGNOSIS — F32.A FATIGUE DUE TO DEPRESSION: ICD-10-CM

## 2022-06-02 DIAGNOSIS — F33.9 RECURRENT DEPRESSIVE DISORDER (HCC): ICD-10-CM

## 2022-06-02 DIAGNOSIS — R53.83 FATIGUE DUE TO DEPRESSION: ICD-10-CM

## 2022-06-02 PROCEDURE — 99213 OFFICE O/P EST LOW 20 MIN: CPT | Performed by: FAMILY MEDICINE

## 2022-06-02 RX ORDER — SIMVASTATIN 20 MG/1
20 TABLET, FILM COATED ORAL
Qty: 90 TABLET | Refills: 2 | Status: SHIPPED | OUTPATIENT
Start: 2022-06-02 | End: 2022-08-19

## 2022-06-02 RX ORDER — NYSTATIN 100000 U/G
CREAM TOPICAL 2 TIMES DAILY
Qty: 30 G | Refills: 0 | Status: SHIPPED | OUTPATIENT
Start: 2022-06-02 | End: 2022-06-24 | Stop reason: ALTCHOICE

## 2022-06-02 NOTE — PROGRESS NOTES
Chief Complaint   Patient presents with    Follow-up     severe itching and burning in hands and feet, been going for the last week and a half, so bad that they turn red. Gets really bad at night        1. \"Have you been to the ER, urgent care clinic since your last visit? Hospitalized since your last visit? \" Yes Where: [address] [place] [address]     2. \"Have you seen or consulted any other health care providers outside of the [address] [place] [address] since your last visit? \" No     3. For patients aged 39-70: Has the patient had a colonoscopy / FIT/ Cologuard? NA - based on age      If the patient is female:    4. For patients aged 41-77: Has the patient had a mammogram within the past 2 years? NA - based on age or sex      11. For patients aged 21-65: Has the patient had a pap smear?  NA - based on age or sex    Health Maintenance Due   Topic Date Due    Pneumococcal 0-64 years (1 - PCV) Never done    DTaP/Tdap/Td series (1 - Tdap) Never done    COVID-19 Vaccine (3 - Booster for Lama Peter series) 02/08/2022

## 2022-06-02 NOTE — PATIENT INSTRUCTIONS
Athlete's Foot: Care Instructions  Your Care Instructions     Athlete's foot is an itchy rash on the foot caused by an infection with a fungus. You can get it by going barefoot in wet public areas, such as swimming pools or locker rooms. Many times there is no clear reason why you get athlete's foot. You can easily treat athlete's foot by putting medicine on your feet for 1 to 6 weeks. In some cases, a doctor may prescribe pills to kill the fungus. Follow-up care is a key part of your treatment and safety. Be sure to make and go to all appointments, and call your doctor if you are having problems. It's also a good idea to know your test results and keep a list of the medicines you take. How can you care for yourself at home? · Your doctor may suggest an over-the counter lotion or spray or may prescribe a medicine. Take your medicines exactly as prescribed. Call your doctor if you think you are having a problem with your medicine. · Keep your feet clean and dry. · When you get dressed, put your socks on before your underwear. This can prevent the fungus from spreading from your feet to your groin. To prevent athlete's foot  · Wear flip-flops or other shower sandals in public locker rooms and showers and by the pool. · Dry between your toes after swimming or bathing. · Wear leather shoes or sandals, which let air get to your feet. · Change your socks as needed so your feet stay as dry as possible. · Use antifungal powder on your feet. When should you call for help? Watch closely for changes in your health, and be sure to contact your doctor if:    · You do not get better as expected. Where can you learn more? Go to http://www.gray.com/  Enter M498 in the search box to learn more about \"Athlete's Foot: Care Instructions. \"  Current as of: November 15, 2021               Content Version: 13.2  © 2579-9000 [employer], [employer].    Care instructions adapted under license by [employer] (which disclaims liability or warranty for this information). If you have questions about a medical condition or this instruction, always ask your healthcare professional. [address] any warranty or liability for your use of this information.

## 2022-06-02 NOTE — PROGRESS NOTES
HISTORY OF PRESENT ILLNESS  Dutch Zepeda is a 39 y.o. male,present with the Rash of the hands and feet,   Started few weeks ago not better tried alcohol washing and OTC antibiotic ointments, dosenot tingles and not pain full, states that is notexpanding red, and not swelled up, whitish patches rounds, with itchiness  Also he has history of hypercholesterolemia has been compliant with the statin therapy denies any muscle ache, currently taking the medication nightly last lipid panel was reviewed and was normal ++++refills needed at this time has been trying to have a low-fat low-cholesterol diet sometimes likes the fast foods weight has been stable       Current Outpatient Medications   Medication Sig Dispense Refill    cetirizine-pseudoePHEDrine (ZyrTEC-D) 5-120 mg Tb12 Take 1 Tablet by mouth two (2) times a day for 30 days. 60 Tablet 1    simvastatin (ZOCOR) 20 mg tablet TAKE 1 TABLET BY MOUTH EVERY NIGHT 30 Tablet 3    nystatin (MYCOSTATIN) topical cream Apply  to affected area two (2) times a day. (Patient not taking: Reported on 5/10/2022) 30 g 0    buPROPion XL (WELLBUTRIN XL) 150 mg tablet Take 150 mg by mouth daily. (Patient not taking: Reported on 5/10/2022)      mirtazapine (REMERON) 15 mg tablet Take 15 mg by mouth nightly. (Patient not taking: Reported on 5/10/2022)       No Known Allergies  Past Medical History:   Diagnosis Date    Anxiety     Anxiety     Depression     Hypercholesterolemia      History reviewed. No pertinent surgical history. Family History   Problem Relation Age of Onset    Hypertension Mother     No Known Problems Father      Social History     Tobacco Use    Smoking status: Current Every Day Smoker     Packs/day: 0.50    Smokeless tobacco: Never Used   Substance Use Topics    Alcohol use:  Yes     Alcohol/week: 3.0 standard drinks     Types: 3 Cans of beer per week      Lab Results   Component Value Date/Time    Hemoglobin A1c 5.4 01/27/2022 10:08 AM    Hemoglobin A1c 5.3 07/29/2020 02:46 PM    Glucose 113 (H) 01/27/2022 10:08 AM    Glucose (POC) 133 (H) 07/15/2021 05:56 PM    LDL, calculated 139.2 (H) 01/27/2022 10:08 AM    Creatinine 1.05 01/27/2022 10:08 AM      Lab Results   Component Value Date/Time    Cholesterol, total 243 (H) 01/27/2022 10:08 AM    HDL Cholesterol 72 01/27/2022 10:08 AM    LDL, calculated 139.2 (H) 01/27/2022 10:08 AM    Triglyceride 159 (H) 01/27/2022 10:08 AM    CHOL/HDL Ratio 3.4 01/27/2022 10:08 AM        Review of Systems   Constitutional: Negative for chills and fever. HENT: Negative for congestion and nosebleeds. Eyes: Negative for blurred vision and pain. Respiratory: Negative for cough, shortness of breath and wheezing. Cardiovascular: Negative for chest pain and leg swelling. Gastrointestinal: Negative for constipation, diarrhea, nausea and vomiting. Genitourinary: Negative for dysuria and frequency. Musculoskeletal: Negative for joint pain and myalgias. Skin: Negative for itching and rash. Neurological: Negative for dizziness, loss of consciousness and headaches. Psychiatric/Behavioral: Negative for depression. The patient is not nervous/anxious and does not have insomnia. Physical Exam  Vitals and nursing note reviewed. Constitutional:       Appearance: He is well-developed. HENT:      Head: Normocephalic and atraumatic. Mouth/Throat:      Pharynx: No oropharyngeal exudate. Eyes:      Conjunctiva/sclera: Conjunctivae normal.      Pupils: Pupils are equal, round, and reactive to light. Neck:      Thyroid: No thyromegaly. Vascular: No JVD. Cardiovascular:      Rate and Rhythm: Normal rate and regular rhythm. Heart sounds: Normal heart sounds. No murmur heard. No friction rub. Pulmonary:      Effort: Pulmonary effort is normal. No respiratory distress. Breath sounds: Normal breath sounds. No wheezing or rales.    Abdominal:      General: Bowel sounds are normal. There is no distension. Palpations: Abdomen is soft. Tenderness: There is no abdominal tenderness. Musculoskeletal:         General: No tenderness. Cervical back: Normal range of motion and neck supple. Lymphadenopathy:      Cervical: No cervical adenopathy. Skin:     General: Skin is warm. Findings: No erythema or rash. Neurological:      Mental Status: He is alert and oriented to person, place, and time. Deep Tendon Reflexes: Reflexes are normal and symmetric. Psychiatric:         Behavior: Behavior normal.         ASSESSMENT and PLAN  Diagnoses and all orders for this visit:    1. Mixed hyperlipidemia  -     simvastatin (ZOCOR) 20 mg tablet; Take 1 Tablet by mouth nightly. 2. Advice given about COVID-19 virus infection  -     simvastatin (ZOCOR) 20 mg tablet; Take 1 Tablet by mouth nightly. 3. Fatigue due to depression  -     simvastatin (ZOCOR) 20 mg tablet; Take 1 Tablet by mouth nightly. 4. Recurrent depressive disorder (HCC)  -     simvastatin (ZOCOR) 20 mg tablet; Take 1 Tablet by mouth nightly. 5. At risk for unsafe behavior  -     nystatin (MYCOSTATIN) topical cream; Apply  to affected area two (2) times a day. LDL target goal, the appropriate diet discussed. lifelong compliance to reduce risk is stressed. A regular exercise program,  maintain normal body weight, fitness,  to avoid fast food Advised, recheck for flp and lft in 3 months rtc fasting, meds side effect and compliance advised.

## 2022-06-16 ENCOUNTER — HOSPITAL ENCOUNTER (EMERGENCY)
Age: 37
Discharge: HOME | End: 2022-06-16
Attending: EMERGENCY MEDICINE
Payer: MEDICAID

## 2022-06-16 VITALS
HEIGHT: 72 IN | DIASTOLIC BLOOD PRESSURE: 83 MMHG | SYSTOLIC BLOOD PRESSURE: 134 MMHG | OXYGEN SATURATION: 96 % | TEMPERATURE: 98.4 F | WEIGHT: 205 LBS | HEART RATE: 114 BPM | BODY MASS INDEX: 27.77 KG/M2 | RESPIRATION RATE: 19 BRPM

## 2022-06-16 DIAGNOSIS — F17.200 TOBACCO DEPENDENCE: ICD-10-CM

## 2022-06-16 DIAGNOSIS — R21 RASH AND OTHER NONSPECIFIC SKIN ERUPTION: Primary | ICD-10-CM

## 2022-06-16 DIAGNOSIS — L29.9 PRURITIC DERMATITIS: ICD-10-CM

## 2022-06-16 PROCEDURE — 99283 EMERGENCY DEPT VISIT LOW MDM: CPT

## 2022-06-16 RX ORDER — PREDNISONE 10 MG/1
TABLET ORAL
Qty: 21 TABLET | Refills: 0 | Status: SHIPPED | OUTPATIENT
Start: 2022-06-16 | End: 2022-06-24 | Stop reason: ALTCHOICE

## 2022-06-16 RX ORDER — NYSTATIN 100000 [USP'U]/G
POWDER TOPICAL
Qty: 30 G | Refills: 0 | Status: SHIPPED | OUTPATIENT
Start: 2022-06-16 | End: 2022-06-24 | Stop reason: ALTCHOICE

## 2022-06-16 RX ORDER — HYDROXYZINE 50 MG/1
50 TABLET, FILM COATED ORAL
Qty: 15 TABLET | Refills: 0 | Status: SHIPPED | OUTPATIENT
Start: 2022-06-16 | End: 2022-07-01

## 2022-06-16 NOTE — DISCHARGE INSTRUCTIONS
Keep skin clean and dry, avoid moist heat. Avoid tobacco. Contact information provided for Dermatologist if symptoms persist. Return to the Emergency dept for any concerns.

## 2022-06-16 NOTE — Clinical Note
[facility] EMERGENCY DEPT  [address] [id number] [phone]    Work/School Note    Date: 6/16/2022    To Whom It May concern:    Tomas Cuellar was seen and treated today in the emergency room by the following provider(s):  Attending Provider: Austen Osuna MD  Physician Assistant: Karena Barnett.      Tomas Cuellar is excused from work/school on 06/16/22 and 06/17/22. He is medically clear to return to work/school on 6/18/2022.        Sincerely,          Karena Hebert

## 2022-06-17 ENCOUNTER — TELEPHONE (OUTPATIENT)
Dept: FAMILY MEDICINE CLINIC | Age: 37
End: 2022-06-17

## 2022-06-17 NOTE — TELEPHONE ENCOUNTER
----- Message from RUIZ Green sent at 6/17/2022  2:03 PM EDT -----  Subject: Referral Request    QUESTIONS   Reason for referral request? The pt would like to know if he can have a   referral for a dermatologist. The pt was in the ER 6/16 for his feet and   hands itching and burning and they referred him to a dermatologist that   cant see him until December. the pt would like to know if there is someone   else that can help him with his ongoing skin condition at this time. Has the physician seen you for this condition before? Yes  Select a date? 2022-06-02  Select the Provider the patient wants to be referred to, if known (PCP or   Specialist)? Antonio Bob   Preferred Specialist (if applicable)? Do you already have an appointment scheduled? Yes  Select Scheduled Date? 2022-12-09  Select Scheduled Physician? Additional Information for Provider?   ---------------------------------------------------------------------------  --------------  CALL BACK INFO  What is the best way for the office to contact you? OK to leave message on   voicemail  Preferred Call Back Phone Number? [phone]  ---------------------------------------------------------------------------  --------------  SCRIPT ANSWERS  Relationship to Patient?  Self

## 2022-06-17 NOTE — TELEPHONE ENCOUNTER
Left message for patient to call back       ----- Message from RUIZ Green sent at 6/17/2022  2:03 PM EDT -----  Subject: Referral Request    QUESTIONS   Reason for referral request? The pt would like to know if he can have a   referral for a dermatologist. The pt was in the ER 6/16 for his feet and   hands itching and burning and they referred him to a dermatologist that   cant see him until December. the pt would like to know if there is someone   else that can help him with his ongoing skin condition at this time. Has the physician seen you for this condition before? Yes  Select a date? 2022-06-02  Select the Provider the patient wants to be referred to, if known (PCP or   Specialist)? Emilee Marquez   Preferred Specialist (if applicable)? Do you already have an appointment scheduled? Yes  Select Scheduled Date? 2022-12-09  Select Scheduled Physician? Additional Information for Provider?   ---------------------------------------------------------------------------  --------------  CALL BACK INFO  What is the best way for the office to contact you? OK to leave message on   voicemail  Preferred Call Back Phone Number? [phone]  ---------------------------------------------------------------------------  --------------  SCRIPT ANSWERS  Relationship to Patient?  Self

## 2022-06-17 NOTE — ED PROVIDER NOTES
EMERGENCY DEPARTMENT HISTORY AND PHYSICAL EXAM      Date: 6/16/2022  Patient Name: Patti Herring    History of Presenting Illness     Chief Complaint   Patient presents with    Rash     rash located on bilateral hands and feet x 1 month, seen by PCP last week and prescribed antifungal that's not helping with the symptoms. History Provided By: Patient    HPI: Patti Herring, 39 y.o. male presents ambulatory to the emergency dept with c/o rash that began on his feet and then progressed to include his hands. \"Now it's everywhere. \" the patient states he was evaluated by his PCP and given a topical antifungal which he has been using all week without relief. He states \"its a little better\" but c/o continued redness, burning and itching. He denied similar sx in the past. No new foods, detergents, medications, or lotions. He rates his overall discomfort a 10/10 and describes it as a constant burning. Pt is o/w healthy without fever, chills, cough, congestion, ST, shortness of breath, chest pain, N/V/D. There are no other complaints, changes, or physical findings at this time. PCP: Chance Thakur MD    Current Outpatient Medications   Medication Sig Dispense Refill    nystatin (MYCOSTATIN) powder Apply to clean dry skin (feet) 4 times daily x 6 weeks 30 g 0    hydrOXYzine HCL (ATARAX) 50 mg tablet Take 1 Tablet by mouth every six (6) hours as needed for Itching for up to 15 days. 15 Tablet 0    predniSONE (STERAPRED DS) 10 mg dose pack Take as directed 21 Tablet 0    simvastatin (ZOCOR) 20 mg tablet Take 1 Tablet by mouth nightly. 90 Tablet 2    nystatin (MYCOSTATIN) topical cream Apply  to affected area two (2) times a day. 30 g 0    buPROPion XL (WELLBUTRIN XL) 150 mg tablet Take 150 mg by mouth daily.  (Patient not taking: Reported on 5/10/2022)         Past History     Past Medical History:  Past Medical History:   Diagnosis Date    Anxiety     Anxiety     Depression     Hypercholesterolemia        Past Surgical History:  No past surgical history on file. Family History:  Family History   Problem Relation Age of Onset    Hypertension Mother     No Known Problems Father        Social History:  Social History     Tobacco Use    Smoking status: Current Every Day Smoker     Packs/day: 0.50    Smokeless tobacco: Never Used   Vaping Use    Vaping Use: Never used   Substance Use Topics    Alcohol use: Yes     Alcohol/week: 3.0 standard drinks     Types: 3 Cans of beer per week    Drug use: Not on file       Allergies:  No Known Allergies      Review of Systems   Review of Systems   Constitutional: Negative for chills and fever. HENT: Negative for congestion, facial swelling, rhinorrhea, sore throat and trouble swallowing. Eyes: Negative for pain and redness. Respiratory: Negative for cough and shortness of breath. Cardiovascular: Negative for chest pain and palpitations. Gastrointestinal: Negative for abdominal pain, diarrhea, nausea and vomiting. Genitourinary: Negative for dysuria and hematuria. Musculoskeletal: Negative for neck pain and neck stiffness. Skin: Positive for rash. Negative for wound. Allergic/Immunologic: Negative for food allergies and immunocompromised state. Neurological: Negative for dizziness and headaches. Hematological: Negative for adenopathy. Does not bruise/bleed easily. Psychiatric/Behavioral: Negative for agitation and confusion. All other systems reviewed and are negative. Physical Exam   Physical Exam  Vitals and nursing note reviewed. Constitutional:       General: He is not in acute distress. Appearance: Normal appearance. He is well-developed and normal weight. He is not ill-appearing, toxic-appearing or diaphoretic. HENT:      Head: Normocephalic and atraumatic. Nose: Nose normal. No congestion or rhinorrhea. Eyes:      General: No scleral icterus. Right eye: No discharge.          Left eye: No discharge. Conjunctiva/sclera: Conjunctivae normal.   Neck:      Thyroid: No thyromegaly. Vascular: No JVD. Trachea: No tracheal deviation. Cardiovascular:      Rate and Rhythm: Normal rate and regular rhythm. Pulses: Normal pulses. Heart sounds: Normal heart sounds. Pulmonary:      Effort: Pulmonary effort is normal. No respiratory distress. Breath sounds: Normal breath sounds. No wheezing. Abdominal:      Palpations: Abdomen is soft. Tenderness: There is no abdominal tenderness. There is no right CVA tenderness, left CVA tenderness, guarding or rebound. Musculoskeletal:         General: No deformity. Normal range of motion. Cervical back: Normal range of motion and neck supple. Skin:     General: Skin is warm and dry. Findings: Rash present. Comments: bilat feet with erythema and circular lesions concerning for tinea, R > L, no drainage, +dermatographia noted. Pt with smaller degree of similar lesions on bilat hands, trunk and proximal UEs. No facial involvement. No stridor, trismus, or drooling. Neurological:      Mental Status: He is alert and oriented to person, place, and time. Motor: No abnormal muscle tone. Coordination: Coordination normal.   Psychiatric:         Mood and Affect: Mood normal.         Behavior: Behavior normal.         Judgment: Judgment normal.         Diagnostic Study Results     Labs -   No results found for this or any previous visit (from the past 12 hour(s)). Radiologic Studies -   No orders to display         Medical Decision Making   I am the first provider for this patient. I reviewed the vital signs, available nursing notes, past medical history, past surgical history, family history and social history. Vital Signs-Reviewed the patient's vital signs.   Patient Vitals for the past 12 hrs:   Temp Pulse Resp BP SpO2   06/16/22 1745 98.4 °F (36.9 °C) (!) 114 19 134/83 96 %           Records Reviewed: Nursing Notes, Old Medical Records, Previous Radiology Studies and Previous Laboratory Studies    Provider Notes (Medical Decision Making):   TINEA, allergic reaction, urticaria, contact dermatitis    ED Course:   Initial assessment performed. The patients presenting problems have been discussed, and they are in agreement with the care plan formulated and outlined with them. I have encouraged them to ask questions as they arise throughout their visit. TOBACCO CESSATION COUNSELING  The patient was counseled on the dangers of tobacco use, and was advised to quit. Reviewed strategies to maximize success, including written materials. DISCHARGE NOTE:  The care plan has been outline with the patient and/or family, who verbally conveyed understanding and agreement. Available results have been reviewed. Patient and/or family understand the follow up plan as outlined and discharge instructions. Should their condition deterioration at any time after discharge the patient agrees to return, follow up sooner than outlined or seek medical assistance at the closest Emergency Room as soon as possible. Questions have been answered. Discharge instructions and educational information regarding the patient's diagnosis as well a list of reasons why the patient would want to seek immediate medical attention, should their condition change, were reviewed directly with the patient/family          PLAN:  1. Discharge Medication List as of 6/16/2022  6:31 PM      START taking these medications    Details   nystatin (MYCOSTATIN) powder Apply to clean dry skin (feet) 4 times daily x 6 weeks, Normal, Disp-30 g, R-0      hydrOXYzine HCL (ATARAX) 50 mg tablet Take 1 Tablet by mouth every six (6) hours as needed for Itching for up to 15 days. , Normal, Disp-15 Tablet, R-0      predniSONE (STERAPRED DS) 10 mg dose pack Take as directed, Normal, Disp-21 Tablet, R-0         CONTINUE these medications which have NOT CHANGED    Details simvastatin (ZOCOR) 20 mg tablet Take 1 Tablet by mouth nightly., Normal, Disp-90 Tablet, R-2      nystatin (MYCOSTATIN) topical cream Apply  to affected area two (2) times a day., Normal, Disp-30 g, R-0      buPROPion XL (WELLBUTRIN XL) 150 mg tablet Take 150 mg by mouth daily. , Historical Med           2. Follow-up Information     Follow up With Specialties Details Why Contact Info    Marium Pierre MD Dermatology Physician   [address] [place] [id number]  [phone]      [address] [place] [address] EMERGENCY DEPT Emergency Medicine  If symptoms worsen [facility]  [phone]        Return to ED if worse     Diagnosis     Clinical Impression:   1. Rash and other nonspecific skin eruption    2. Pruritic dermatitis    3.  Tobacco dependence

## 2022-06-24 ENCOUNTER — OFFICE VISIT (OUTPATIENT)
Dept: FAMILY MEDICINE CLINIC | Age: 37
End: 2022-06-24
Payer: MEDICAID

## 2022-06-24 VITALS
SYSTOLIC BLOOD PRESSURE: 120 MMHG | RESPIRATION RATE: 18 BRPM | DIASTOLIC BLOOD PRESSURE: 87 MMHG | BODY MASS INDEX: 26.8 KG/M2 | TEMPERATURE: 98.9 F | WEIGHT: 202.2 LBS | HEART RATE: 114 BPM | HEIGHT: 73 IN | OXYGEN SATURATION: 99 %

## 2022-06-24 DIAGNOSIS — L20.82 FLEXURAL ECZEMA: ICD-10-CM

## 2022-06-24 DIAGNOSIS — Z71.89 ADVICE GIVEN ABOUT COVID-19 VIRUS INFECTION: ICD-10-CM

## 2022-06-24 DIAGNOSIS — R00.0 TACHYCARDIA: Primary | ICD-10-CM

## 2022-06-24 DIAGNOSIS — G89.29 CHRONIC MIDLINE BACK PAIN, UNSPECIFIED BACK LOCATION: ICD-10-CM

## 2022-06-24 DIAGNOSIS — M54.9 CHRONIC MIDLINE BACK PAIN, UNSPECIFIED BACK LOCATION: ICD-10-CM

## 2022-06-24 PROCEDURE — 99214 OFFICE O/P EST MOD 30 MIN: CPT | Performed by: FAMILY MEDICINE

## 2022-06-24 RX ORDER — LIDOCAINE 50 MG/G
PATCH TOPICAL
Qty: 30 EACH | Refills: 2 | Status: SHIPPED | OUTPATIENT
Start: 2022-06-24 | End: 2022-08-19

## 2022-06-24 RX ORDER — METOPROLOL SUCCINATE 25 MG/1
25 TABLET, EXTENDED RELEASE ORAL DAILY
Qty: 30 TABLET | Refills: 1 | Status: SHIPPED | OUTPATIENT
Start: 2022-06-24 | End: 2022-08-19

## 2022-06-24 RX ORDER — NYSTATIN 100000 U/G
CREAM TOPICAL 2 TIMES DAILY
Qty: 30 G | Refills: 0 | Status: CANCELLED | OUTPATIENT
Start: 2022-06-24

## 2022-06-24 RX ORDER — DICLOFENAC SODIUM 10 MG/G
4 GEL TOPICAL 4 TIMES DAILY
Qty: 150 G | Refills: 5 | Status: SHIPPED | OUTPATIENT
Start: 2022-06-24 | End: 2022-08-19

## 2022-06-24 RX ORDER — BETAMETHASONE DIPROPIONATE 0.5 MG/G
CREAM TOPICAL 2 TIMES DAILY
Qty: 45 G | Refills: 3 | Status: SHIPPED | OUTPATIENT
Start: 2022-06-24 | End: 2022-08-19

## 2022-06-24 NOTE — LETTER
NOTIFICATION RETURN TO WORK / SCHOOL        6/24/2022 11:18 AM    Mr. Francois Dubin  [facility] [facility] [phone]      To Whom It May Concern:    Francois Dubin is currently under the care of [address] OFF[facility]. He will return to work/school on: 6/25/2022    If there are questions or concerns please have the patient contact our office.         Sincerely,      Susy Pires MD

## 2022-06-24 NOTE — PROGRESS NOTES
Chief Complaint   Patient presents with    Rash     rash on foot that hurt, and could not walk        1. \"Have you been to the ER, urgent care clinic since your last visit? Hospitalized since your last visit? \" Yes Where: [address] [place] [address]  Reason for visit: Rash on foot    2. \"Have you seen or consulted any other health care providers outside of the [address] [place] [address] since your last visit? \" No     3. For patients aged 39-70: Has the patient had a colonoscopy / FIT/ Cologuard? NA - based on age      If the patient is female:    4. For patients aged 41-77: Has the patient had a mammogram within the past 2 years? NA - based on age or sex      11. For patients aged 21-65: Has the patient had a pap smear?  NA - based on age or sex    Health Maintenance Due   Topic Date Due    Pneumococcal 0-64 years (1 - PCV) Never done    DTaP/Tdap/Td series (1 - Tdap) Never done    COVID-19 Vaccine (3 - Booster for [employer] series) 02/08/2022

## 2022-06-24 NOTE — PROGRESS NOTES
HISTORY OF PRESENT ILLNESS  Niurka Ramirez is a 39 y.o. male, present with a complaint of scattered throughout the body denies any contact with any person with the same rash, no traveling  Started few weeks ago not better tried alcohol washing and OTC antibiotic ointments, dosenot tingles and not pain full, states that is not expanding red, and not  swelled up, whitish patches rounds, with itchiness, last visit patient was given nystatin therapy patient today states that the medication not helping the condition    Patient also complaining of low back pain tried over-the-counter medication unfortunately the pain is 6 out of 10 denies any incontinence of urine or stool pain is dull denies any injury  Also states that he has had family history of heart disease he has not seen a heart doctor currently denies any illicit drug use no alcohol no palpitation and has no racing heart , denies any chest pain or dizziness at this time                      Current Outpatient Medications   Medication Sig Dispense Refill    betamethasone dipropionate (DIPROSONE) 0.05 % topical cream Apply  to affected area two (2) times a day. 45 g 3    nystatin (MYCOSTATIN) powder Apply to clean dry skin (feet) 4 times daily x 6 weeks 30 g 0    hydrOXYzine HCL (ATARAX) 50 mg tablet Take 1 Tablet by mouth every six (6) hours as needed for Itching for up to 15 days. 15 Tablet 0    predniSONE (STERAPRED DS) 10 mg dose pack Take as directed 21 Tablet 0    simvastatin (ZOCOR) 20 mg tablet Take 1 Tablet by mouth nightly. 90 Tablet 2    nystatin (MYCOSTATIN) topical cream Apply  to affected area two (2) times a day. 30 g 0    buPROPion XL (WELLBUTRIN XL) 150 mg tablet Take 150 mg by mouth daily. (Patient not taking: Reported on 5/10/2022)       No Known Allergies  Past Medical History:   Diagnosis Date    Anxiety     Anxiety     Depression     Hypercholesterolemia      History reviewed. No pertinent surgical history.   Family History Problem Relation Age of Onset    Hypertension Mother     No Known Problems Father      Social History     Tobacco Use    Smoking status: Current Every Day Smoker     Packs/day: 0.50    Smokeless tobacco: Never Used   Substance Use Topics    Alcohol use: Yes     Alcohol/week: 3.0 standard drinks     Types: 3 Cans of beer per week      Lab Results   Component Value Date/Time    Cholesterol, total 243 (H) 01/27/2022 10:08 AM    HDL Cholesterol 72 01/27/2022 10:08 AM    LDL, calculated 139.2 (H) 01/27/2022 10:08 AM    Triglyceride 159 (H) 01/27/2022 10:08 AM    CHOL/HDL Ratio 3.4 01/27/2022 10:08 AM     Lab Results   Component Value Date/Time    ALT (SGPT) 30 12/15/2020 03:48 PM    Alk. phosphatase 86 12/15/2020 03:48 PM    Bilirubin, total 0.5 12/15/2020 03:48 PM    Albumin 3.6 12/15/2020 03:48 PM    Protein, total 7.5 12/15/2020 03:48 PM    PLATELET 776 97/09/8825 03:48 PM    Hepatitis B surface Ag <0.10 01/27/2022 10:08 AM        Review of Systems   Constitutional: Positive for malaise/fatigue. Negative for chills and fever. HENT: Negative for nosebleeds. Eyes: Negative for pain. Respiratory: Negative for cough and wheezing. Cardiovascular: Negative for chest pain and leg swelling. Gastrointestinal: Negative for constipation, diarrhea and nausea. Genitourinary: Negative for frequency. Musculoskeletal: Negative for joint pain and myalgias. Skin: Positive for itching and rash. Neurological: Negative for loss of consciousness and headaches. Endo/Heme/Allergies: Does not bruise/bleed easily. Psychiatric/Behavioral: Negative for depression. The patient is not nervous/anxious and does not have insomnia. All other systems reviewed and are negative. Physical Exam  Vitals and nursing note reviewed. Constitutional:       Appearance: He is well-developed. HENT:      Head: Normocephalic and atraumatic. Mouth/Throat:      Pharynx: No oropharyngeal exudate.    Eyes: Conjunctiva/sclera: Conjunctivae normal.      Pupils: Pupils are equal, round, and reactive to light. Neck:      Thyroid: No thyromegaly. Vascular: No JVD. Cardiovascular:      Rate and Rhythm: Normal rate and regular rhythm. Heart sounds: Normal heart sounds. No murmur heard. No friction rub. Pulmonary:      Effort: Pulmonary effort is normal. No respiratory distress. Breath sounds: Normal breath sounds. No wheezing or rales. Abdominal:      General: Bowel sounds are normal. There is no distension. Palpations: Abdomen is soft. Tenderness: There is no abdominal tenderness. Musculoskeletal:         General: No tenderness. Cervical back: Normal range of motion and neck supple. Lymphadenopathy:      Cervical: No cervical adenopathy. Skin:     General: Skin is warm. Findings: Erythema and rash present. Neurological:      Mental Status: He is alert and oriented to person, place, and time. Deep Tendon Reflexes: Reflexes are normal and symmetric. Psychiatric:         Behavior: Behavior normal.         ASSESSMENT and PLAN  Diagnoses and all orders for this visit:    1. Tachycardia  -     REFERRAL TO CARDIOLOGY  -     metoprolol succinate (TOPROL-XL) 25 mg XL tablet; Take 1 Tablet by mouth daily. 2. Chronic midline back pain, unspecified back location  -     diclofenac (VOLTAREN) 1 % gel; Apply 4 g to affected area four (4) times daily. -     lidocaine (LIDODERM) 5 %; Apply patch to the affected area for 12 hours a day and remove for 12 hours a day. 3. Flexural eczema  -     betamethasone dipropionate (DIPROSONE) 0.05 % topical cream; Apply  to affected area two (2) times a day.     4. Advice given about COVID-19 virus infection          today pt was told to notify the pcp for the temp greated than 101 for 24 hours, any kind of chest pain,worsening shortness of breath, nausea, vomiting, diarrhea, dizziness, falling, weakness, bleeding, any pain not relieved by medications, cont current medicaitons,

## 2022-08-19 ENCOUNTER — HOSPITAL ENCOUNTER (EMERGENCY)
Age: 37
Discharge: HOME | End: 2022-08-19
Attending: EMERGENCY MEDICINE
Payer: MEDICAID

## 2022-08-19 VITALS
HEIGHT: 72 IN | BODY MASS INDEX: 26.41 KG/M2 | DIASTOLIC BLOOD PRESSURE: 97 MMHG | TEMPERATURE: 98.3 F | OXYGEN SATURATION: 97 % | SYSTOLIC BLOOD PRESSURE: 153 MMHG | WEIGHT: 195 LBS | HEART RATE: 104 BPM | RESPIRATION RATE: 16 BRPM

## 2022-08-19 DIAGNOSIS — F41.1 ANXIETY STATE: Primary | ICD-10-CM

## 2022-08-19 PROCEDURE — 99283 EMERGENCY DEPT VISIT LOW MDM: CPT

## 2022-08-19 PROCEDURE — 93005 ELECTROCARDIOGRAM TRACING: CPT

## 2022-08-19 RX ORDER — BUSPIRONE HYDROCHLORIDE 5 MG/1
5 TABLET ORAL 3 TIMES DAILY
COMMUNITY
Start: 2022-07-26

## 2022-08-19 RX ORDER — HYDROXYZINE 50 MG/1
50 TABLET, FILM COATED ORAL
Qty: 20 TABLET | Refills: 0 | Status: SHIPPED | OUTPATIENT
Start: 2022-08-19
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
## 2022-08-19 NOTE — ED TRIAGE NOTES
Patient presents to the ED with c/o waking up this morning at 5 am feeling anxious. Reports his chest tingling and feeling SOB. Reports a hx of panic attacks but does not take any medications for it. Reports a headache.

## 2022-08-19 NOTE — ED NOTES
Verbal shift change report given to TAVO Becker (oncoming nurse) by Ant Amin RN (offgoing nurse). Report included the following information SBAR.

## 2022-08-19 NOTE — ED NOTES
Patient is AOx4. Respirations are even and unlabored. Skin is warm and dry. Patient to ED for mid chest tingling, tingling to bilateral arms and L leg, feeling like he couldn't breath since 0500 this morning. Patient reports chronic anxiety, just started taking Buspar recently. Reports had a \"stressful situation\" last night where him and his significant other broke up. Reports proximal HA, and feeling faint earlier today. Endorses working out a lot recently, feels he may be dehydrated. Also reports ETOH use last night. Bed in lowest locked position. Call bell within reach. Assessment complete, patient updated on plan of care. Emergency Department Nursing Plan of Care       The Nursing Plan of Care is developed from the Nursing assessment and Emergency Department Attending provider initial evaluation. The plan of care may be reviewed in the ED Provider note.     The Plan of Care was developed with the following considerations:   Patient / Family readiness to learn indicated by:verbalized understanding  Persons(s) to be included in education: patient  Barriers to Learning/Limitations:No    Signed     Erasmo Alexandre RN    8/19/2022   7:06 AM

## 2022-08-19 NOTE — ED NOTES
Verbal shift change report given to Tony Coleman RN (oncoming nurse) by Jose Caal RN (offgoing nurse). Report included the following information SBAR and ED Summary.

## 2022-08-22 LAB
ATRIAL RATE: 99 BPM
CALCULATED P AXIS, ECG09: 56 DEGREES
CALCULATED R AXIS, ECG10: 55 DEGREES
CALCULATED T AXIS, ECG11: 35 DEGREES
DIAGNOSIS, 93000: NORMAL
P-R INTERVAL, ECG05: 156 MS
Q-T INTERVAL, ECG07: 334 MS
QRS DURATION, ECG06: 84 MS
QTC CALCULATION (BEZET), ECG08: 428 MS
VENTRICULAR RATE, ECG03: 99 BPM

## 2022-09-16 ENCOUNTER — HOSPITAL ENCOUNTER (EMERGENCY)
Age: 37
Discharge: HOME | End: 2022-09-16
Attending: EMERGENCY MEDICINE
Payer: MEDICAID

## 2022-09-16 ENCOUNTER — APPOINTMENT (OUTPATIENT)
Dept: CT IMAGING | Age: 37
End: 2022-09-16
Attending: EMERGENCY MEDICINE
Payer: MEDICAID

## 2022-09-16 VITALS
DIASTOLIC BLOOD PRESSURE: 77 MMHG | TEMPERATURE: 98.3 F | SYSTOLIC BLOOD PRESSURE: 114 MMHG | WEIGHT: 201.5 LBS | RESPIRATION RATE: 15 BRPM | HEIGHT: 72 IN | OXYGEN SATURATION: 98 % | BODY MASS INDEX: 27.29 KG/M2 | HEART RATE: 83 BPM

## 2022-09-16 DIAGNOSIS — G43.909 MIGRAINE WITHOUT STATUS MIGRAINOSUS, NOT INTRACTABLE, UNSPECIFIED MIGRAINE TYPE: Primary | ICD-10-CM

## 2022-09-16 LAB
ANION GAP SERPL CALC-SCNC: 6 MMOL/L (ref 5–15)
BASOPHILS # BLD: 0 K/UL (ref 0–0.1)
BASOPHILS NFR BLD: 0 % (ref 0–1)
BUN SERPL-MCNC: 12 MG/DL (ref 6–20)
BUN/CREAT SERPL: 11 (ref 12–20)
CALCIUM SERPL-MCNC: 9 MG/DL (ref 8.5–10.1)
CHLORIDE SERPL-SCNC: 102 MMOL/L (ref 97–108)
CO2 SERPL-SCNC: 28 MMOL/L (ref 21–32)
CREAT SERPL-MCNC: 1.1 MG/DL (ref 0.7–1.3)
DIFFERENTIAL METHOD BLD: ABNORMAL
EOSINOPHIL # BLD: 0.3 K/UL (ref 0–0.4)
EOSINOPHIL NFR BLD: 4 % (ref 0–7)
ERYTHROCYTE [DISTWIDTH] IN BLOOD BY AUTOMATED COUNT: 13.2 % (ref 11.5–14.5)
GLUCOSE SERPL-MCNC: 103 MG/DL (ref 65–100)
HCT VFR BLD AUTO: 42 % (ref 36.6–50.3)
HGB BLD-MCNC: 14.1 G/DL (ref 12.1–17)
IMM GRANULOCYTES # BLD AUTO: 0.1 K/UL (ref 0–0.04)
IMM GRANULOCYTES NFR BLD AUTO: 1 % (ref 0–0.5)
LYMPHOCYTES # BLD: 1.8 K/UL (ref 0.8–3.5)
LYMPHOCYTES NFR BLD: 20 % (ref 12–49)
MCH RBC QN AUTO: 29.6 PG (ref 26–34)
MCHC RBC AUTO-ENTMCNC: 33.6 G/DL (ref 30–36.5)
MCV RBC AUTO: 88.2 FL (ref 80–99)
MONOCYTES # BLD: 0.5 K/UL (ref 0–1)
MONOCYTES NFR BLD: 6 % (ref 5–13)
NEUTS SEG # BLD: 6.4 K/UL (ref 1.8–8)
NEUTS SEG NFR BLD: 69 % (ref 32–75)
NRBC # BLD: 0 K/UL (ref 0–0.01)
NRBC BLD-RTO: 0 PER 100 WBC
PLATELET # BLD AUTO: 378 K/UL (ref 150–400)
PMV BLD AUTO: 8.4 FL (ref 8.9–12.9)
POTASSIUM SERPL-SCNC: 4.1 MMOL/L (ref 3.5–5.1)
RBC # BLD AUTO: 4.76 M/UL (ref 4.1–5.7)
SODIUM SERPL-SCNC: 136 MMOL/L (ref 136–145)
WBC # BLD AUTO: 9.2 K/UL (ref 4.1–11.1)

## 2022-09-16 PROCEDURE — 85025 COMPLETE CBC W/AUTO DIFF WBC: CPT

## 2022-09-16 PROCEDURE — 96375 TX/PRO/DX INJ NEW DRUG ADDON: CPT

## 2022-09-16 PROCEDURE — 96374 THER/PROPH/DIAG INJ IV PUSH: CPT

## 2022-09-16 PROCEDURE — 36415 COLL VENOUS BLD VENIPUNCTURE: CPT

## 2022-09-16 PROCEDURE — 80048 BASIC METABOLIC PNL TOTAL CA: CPT

## 2022-09-16 PROCEDURE — 70450 CT HEAD/BRAIN W/O DYE: CPT

## 2022-09-16 PROCEDURE — 74011250636 HC RX REV CODE- 250/636: Performed by: EMERGENCY MEDICINE

## 2022-09-16 PROCEDURE — 99284 EMERGENCY DEPT VISIT MOD MDM: CPT

## 2022-09-16 RX ORDER — BUTALBITAL, ACETAMINOPHEN AND CAFFEINE 300; 40; 50 MG/1; MG/1; MG/1
1 CAPSULE ORAL
Qty: 28 CAPSULE | Refills: 0 | Status: SHIPPED | OUTPATIENT
Start: 2022-09-16 | End: 2022-09-23

## 2022-09-16 RX ORDER — PROCHLORPERAZINE EDISYLATE 5 MG/ML
10 INJECTION INTRAMUSCULAR; INTRAVENOUS
Status: COMPLETED | OUTPATIENT
Start: 2022-09-16 | End: 2022-09-16

## 2022-09-16 RX ORDER — KETOROLAC TROMETHAMINE 30 MG/ML
15 INJECTION, SOLUTION INTRAMUSCULAR; INTRAVENOUS
Status: COMPLETED | OUTPATIENT
Start: 2022-09-16 | End: 2022-09-16

## 2022-09-16 RX ORDER — DIPHENHYDRAMINE HYDROCHLORIDE 50 MG/ML
25 INJECTION, SOLUTION INTRAMUSCULAR; INTRAVENOUS
Status: COMPLETED | OUTPATIENT
Start: 2022-09-16 | End: 2022-09-16

## 2022-09-16 RX ORDER — SODIUM CHLORIDE 0.9 % (FLUSH) 0.9 %
5-40 SYRINGE (ML) INJECTION EVERY 8 HOURS
Status: DISCONTINUED | OUTPATIENT
Start: 2022-09-16 | End: 2022-09-16 | Stop reason: HOSPADM

## 2022-09-16 RX ADMIN — KETOROLAC TROMETHAMINE 15 MG: 30 INJECTION, SOLUTION INTRAMUSCULAR; INTRAVENOUS at 15:36

## 2022-09-16 RX ADMIN — DIPHENHYDRAMINE HYDROCHLORIDE 25 MG: 50 INJECTION INTRAMUSCULAR; INTRAVENOUS at 15:36

## 2022-09-16 RX ADMIN — PROCHLORPERAZINE EDISYLATE 10 MG: 5 INJECTION, SOLUTION INTRAMUSCULAR; INTRAVENOUS at 15:36

## 2022-09-16 RX ADMIN — SODIUM CHLORIDE 1000 ML: 9 INJECTION, SOLUTION INTRAVENOUS at 15:36

## 2022-09-16 NOTE — ED PROVIDER NOTES
EMERGENCY DEPARTMENT HISTORY AND PHYSICAL EXAM      Date: 9/16/2022  Patient Name: Richard Henriquez    History of Presenting Illness     Chief Complaint   Patient presents with    Clarissa Poot     Per pt reports blurred vision x today, +left hand numbness that radiates up the left arm and lip numbness that started today as well, +headache, denies dizziness. Speech is clear, pt able to ambulate without difficulty, bilateral  are equal and strong. History Provided By: Patient    HPI: Richard Henriquez, 39 y.o. male presents to the ED with cc of headache, blurry vision and numbness on the left hand started at about midday today. Patient has had similar episodes like this in the past, currently the numbness in the hand is gone. Patient rates his headache 8 out of 10, patient also states that this is not the worst headache of his life. Denies fever, nausea vomiting shortness of breath or chest pain. There are no other associated symptoms, patient concerns, or physical findings at this time. I reviewed the vital signs, available nursing notes, past medical history, past surgical history, family history and social history. Vital Signs:  Patient Vitals for the past 12 hrs:   Temp Pulse Resp BP SpO2   09/16/22 1457 98.3 °F (36.8 °C) 98 19 129/85 98 %     Vital signs reviewed. Current Medications:  No current facility-administered medications on file prior to encounter. Current Outpatient Medications on File Prior to Encounter   Medication Sig Dispense Refill    busPIRone (BUSPAR) 5 mg tablet Take 5 mg by mouth three (3) times daily. hydrOXYzine HCL (ATARAX) 50 mg tablet Take 1 Tablet by mouth every eight (8) hours as needed for Anxiety. 20 Tablet 0       Past History     Past Medical History:  Past Medical History:   Diagnosis Date    Anxiety     Anxiety     Depression     Hypercholesterolemia        Past Surgical History:  No past surgical history on file.     Family History:  Family History   Problem Relation Age of Onset    Hypertension Mother     No Known Problems Father        Social History:  Social History     Tobacco Use    Smoking status: Every Day     Packs/day: 1.00     Types: Cigarettes    Smokeless tobacco: Never   Vaping Use    Vaping Use: Never used   Substance Use Topics    Alcohol use: Not Currently     Comment: 5 days per week, 2 beers    Drug use: Not Currently     Types: Marijuana       Allergies:  No Known Allergies      Review of Systems   Review of Systems   Constitutional:  Negative for fever. HENT:  Negative for sore throat and trouble swallowing. Eyes:  Positive for visual disturbance. Negative for photophobia and redness. Respiratory:  Negative for cough and shortness of breath. Cardiovascular:  Negative for chest pain and leg swelling. Gastrointestinal:  Negative for abdominal pain, constipation, diarrhea, nausea and vomiting. Endocrine: Negative for polydipsia and polyuria. Genitourinary:  Negative for dysuria, hematuria and scrotal swelling. Musculoskeletal:  Negative for back pain and joint swelling. Skin:  Negative for rash. Neurological:  Positive for numbness and headaches. Negative for dizziness, syncope and weakness. Psychiatric/Behavioral:  Negative for suicidal ideas. All other systems reviewed and are negative. Physical Exam   Physical Exam  Vitals and nursing note reviewed. Exam conducted with a chaperone present. Constitutional:       General: He is not in acute distress. Appearance: Normal appearance. He is well-developed. HENT:      Head: Normocephalic and atraumatic. Mouth/Throat:      Pharynx: No oropharyngeal exudate. Eyes:      General:         Right eye: No discharge. Left eye: No discharge. Extraocular Movements: Extraocular movements intact. Conjunctiva/sclera: Conjunctivae normal.      Pupils: Pupils are equal, round, and reactive to light. Neck:      Vascular: No JVD. Cardiovascular:      Rate and Rhythm: Normal rate and regular rhythm. Heart sounds: Normal heart sounds. Pulmonary:      Effort: Pulmonary effort is normal. No respiratory distress. Breath sounds: Normal breath sounds. No wheezing. Abdominal:      General: Bowel sounds are normal. There is no distension. Palpations: Abdomen is soft. Tenderness: There is no abdominal tenderness. There is no guarding or rebound. Musculoskeletal:         General: No tenderness. Normal range of motion. Cervical back: Normal range of motion and neck supple. Lymphadenopathy:      Cervical: No cervical adenopathy. Skin:     General: Skin is warm and dry. Capillary Refill: Capillary refill takes less than 2 seconds. Findings: No rash. Neurological:      General: No focal deficit present. Mental Status: He is alert and oriented to person, place, and time. Cranial Nerves: No cranial nerve deficit. Deep Tendon Reflexes: Reflexes are normal and symmetric. Psychiatric:         Behavior: Behavior normal.       Emergency Department Course   ED Course:  Initial assessment performed. The patient's complaints have been discussed, and they are in agreement with the care plan formulated and outlined with them. I have encouraged them to ask questions as they arise throughout their visit. EKG interpretation: (Preliminary)  Medical Decision Making:  Acute migraine headache, subarachnoid hemorrhage, CVA. Critical Care Time:      Procedure:      Progress note:   Time:    Disposition:  DISCHARGED at 5:00 pm,  I reviewed exam findings, diagnostic results, and clinical impression with patient. Counseled patient on diagnosis and care plan. Encouraged patient to ask questions and discussed need for follow up with primary care and to return to ED precautions. Patient expresses understanding at this time.  I have reviewed discharge instructions with the patient and/or family/caregiver who verbalized understanding. The patient has been re-evaluated and is ready for discharge. Discharge instructions have been provided and explained to the patient. Ready for discharge. DISCHARGE PLAN:  1. Current Discharge Medication List        2. Follow-up Information    None       3. Return to ED if current symptoms worsen or new symptoms arise. 4. Follow up with Stephen Charles MD in 3-5 days. Diagnosis     Clinical Impression: No diagnosis found.

## 2022-09-16 NOTE — ED NOTES
Emergency Department Nursing Plan of Care       The Nursing Plan of Care is developed from the Nursing assessment and Emergency Department Attending provider initial evaluation. The plan of care may be reviewed in the ED Provider note.     The Plan of Care was developed with the following considerations:   Patient / Family readiness to learn indicated by:verbalized understanding  Persons(s) to be included in education: patient  Barriers to Learning/Limitations:No    Signed     Lum Cancer    9/16/2022   3:27 PM

## 2022-09-16 NOTE — ED NOTES
Pt arrives to the ED AAOX4 with a c/c of HA and light sensitivity onset noon. Pt states he felt numbness on his left hand and left side of his face earlier, but is now gone. Pt additionally reports intermittent blurred vision, now normal. Pt denies any dizziness, able to speak in full sentences and to walk with steady gait. Pt is noted in stable condition, now in ED room with side rail up, bed to lowest position, and call light within reach. Will continue to monitor, ED provider at bedside to assess pt.

## 2022-10-21 ENCOUNTER — NURSE TRIAGE (OUTPATIENT)
Dept: OTHER | Facility: CLINIC | Age: 37
End: 2022-10-21

## 2022-10-21 NOTE — TELEPHONE ENCOUNTER
Location of patient: [facility] [address] call from Claire Antony at [facility] with [employer] Subjective: Caller states \"Back and knee pain\"     Current Symptoms: Lower back pain;  Bilateral knee;  Denies injury; Left knee swelling;  Brain fog;  Took the day off from work due to brain fog; Onset: several years ago;     Associated Symptoms: reduced activity    Pain Severity: 7/10; aching;     Temperature: denies     What has been tried: Aleve    Recommended disposition: See PCP within 3 Days    Care advice provided, patient verbalizes understanding; denies any other questions or concerns; instructed to call back for any new or worsening symptoms. Patient/Caller agrees with recommended disposition; writer provided warm transfer to Kamaljit Kamara at [facility] for appointment scheduling    Attention Provider: Thank you for allowing me to participate in the care of your patient. The patient was connected to triage in response to information provided to the ECC. Please do not respond through this encounter as the response is not directed to a shared pool.     Reason for Disposition   [1] MODERATE pain (e.g., interferes with normal activities, limping) AND [2] present > 3 days    Protocols used: Knee Pain-ADULT-AH

## 2022-10-28 ENCOUNTER — NURSE TRIAGE (OUTPATIENT)
Dept: OTHER | Facility: CLINIC | Age: 37
End: 2022-10-28

## 2022-10-28 NOTE — TELEPHONE ENCOUNTER
Location of patient: [address] mortensen from [facility] Banda at [facility] with [employer]. Subjective: Caller states \"There are times that my heart feels like it is racing. I awakened up the other night and my heart was racing, sometimes I can't breathe, and I had to calm myself down - almost called 911. I've had these feelings off and on for most of my life. I almost feel out at work today. I wasn't even doing anything strenuous. There are times that sweat will appear, but there isn't a fever. \"     Onset: 1 day ago; sudden    Pain Severity:  I have chronic pain in the lower back and knees. There isn't any pain associated with the elevated heart rate. Temperature: no fever     What has been tried: meditation music, listens to the [address] [place] [address] Avenue, working out    Recommended disposition: See PCP within 3 Days    Care advice provided, patient verbalizes understanding; denies any other questions or concerns; instructed to call back for any new or worsening symptoms. Mary, Service Expert, is working to make an appt for pt. Attention Provider: Thank you for allowing me to participate in the care of your patient. The patient was connected to triage in response to information provided to the [facility]. Please do not respond through this encounter as the response is not directed to a shared pool.     Reason for Disposition   [1] Palpitations AND [2] no improvement after using CARE ADVICE    Protocols used: Heart Rate and Heartbeat Questions-ADULT-

## 2022-10-31 ENCOUNTER — OFFICE VISIT (OUTPATIENT)
Dept: FAMILY MEDICINE CLINIC | Age: 37
End: 2022-10-31

## 2022-10-31 VITALS
WEIGHT: 206.6 LBS | BODY MASS INDEX: 27.38 KG/M2 | RESPIRATION RATE: 17 BRPM | HEART RATE: 108 BPM | SYSTOLIC BLOOD PRESSURE: 123 MMHG | DIASTOLIC BLOOD PRESSURE: 82 MMHG | HEIGHT: 73 IN | OXYGEN SATURATION: 97 % | TEMPERATURE: 98.3 F

## 2022-10-31 DIAGNOSIS — F10.982 ALCOHOL-INDUCED INSOMNIA (HCC): ICD-10-CM

## 2022-10-31 DIAGNOSIS — Z13.31 POSITIVE DEPRESSION SCREENING: ICD-10-CM

## 2022-10-31 DIAGNOSIS — M54.40 ACUTE BILATERAL LOW BACK PAIN WITH SCIATICA, SCIATICA LATERALITY UNSPECIFIED: ICD-10-CM

## 2022-10-31 DIAGNOSIS — R42 DIZZINESSES: ICD-10-CM

## 2022-10-31 DIAGNOSIS — F33.9 RECURRENT DEPRESSIVE DISORDER (HCC): Primary | ICD-10-CM

## 2022-10-31 PROCEDURE — 99214 OFFICE O/P EST MOD 30 MIN: CPT | Performed by: FAMILY MEDICINE

## 2022-10-31 RX ORDER — METHYLPREDNISOLONE 4 MG/1
TABLET ORAL
Qty: 1 DOSE PACK | Refills: 0 | Status: SHIPPED | OUTPATIENT
Start: 2022-10-31

## 2022-10-31 RX ORDER — CLONIDINE HYDROCHLORIDE 0.2 MG/1
0.2 TABLET ORAL
Qty: 30 TABLET | Refills: 0 | Status: SHIPPED | OUTPATIENT
Start: 2022-10-31

## 2022-10-31 RX ORDER — SERTRALINE HYDROCHLORIDE 25 MG/1
25 TABLET, FILM COATED ORAL DAILY
Qty: 30 TABLET | Refills: 0 | Status: SHIPPED | OUTPATIENT
Start: 2022-10-31 | End: 2022-11-30

## 2022-10-31 RX ORDER — DICLOFENAC POTASSIUM 50 MG/1
50 TABLET, FILM COATED ORAL 3 TIMES DAILY
Qty: 30 TABLET | Refills: 0 | Status: SHIPPED | OUTPATIENT
Start: 2022-10-31 | End: 2022-11-10

## 2022-10-31 NOTE — PROGRESS NOTES
Chief Complaint   Patient presents with    Follow-up     Knee and back pain, fatique, mind has been cloudy, waking up with heart racing and not being able to breath, Almost fell out at work the other day. 1. \"Have you been to the ER, urgent care clinic since your last visit? Hospitalized since your last visit? \" Yes Where: [facility] [place]     2. \"Have you seen or consulted any other health care providers outside of the [address] [place] [address] since your last visit? \" No     3. For patients aged 39-70: Has the patient had a colonoscopy / FIT/ Cologuard? NA - based on age      If the patient is female:    4. For patients aged 41-77: Has the patient had a mammogram within the past 2 years? NA - based on age or sex      11. For patients aged 21-65: Has the patient had a pap smear?  NA - based on age or sex    Health Maintenance Due   Topic Date Due    Pneumococcal 0-64 years (1 - PCV) Never done    DTaP/Tdap/Td series (1 - Tdap) Never done    COVID-19 Vaccine (3 - Booster for Pfizer series) 11/03/2021    Flu Vaccine (1) 08/01/2022

## 2022-10-31 NOTE — PROGRESS NOTES
HISTORY OF PRESENT ILLNESS  Flynn Godoy is a 40 y.o. male, Patient presents with current history of major depression, worries that there is something wrong with his heart, ++cigs, ++Etoh, few beers daily, for few yrs, unable to sleep at night, tried seroquel, trazodone, none helping,  today patient present with a complaint of dizziness stating that it last few minutes,  denies any chest pain, as per the patient each episode lasting 5 min +spinning sensation, patient has no sweating no chest pain no LOC, currently working and is able to drive without any trouble in fact patient drove to [facility] today without any family member stating that the patient has no trouble with driving nor working ,   patient also stating that is not stressed out, patient denies any neck pain and has no nausea vomiting in addition, the hearing is normal and denies any ringing sensation patient states that trouble started few years ago and patient was told to have vertigo has been given over-the-counter medication, has taken the given medication currently is much better and at this time patient has no dizziness no spinning sensation likes to know what the patient need to do if the patient becomes dizzy in the future,  unfortunately this is not the patient first time,         Current Outpatient Medications   Medication Sig Dispense Refill    sertraline (Zoloft) 25 mg tablet Take 1 Tablet by mouth daily for 30 days. 30 Tablet 0    busPIRone (BUSPAR) 5 mg tablet Take 5 mg by mouth three (3) times daily. (Patient not taking: Reported on 10/31/2022)      hydrOXYzine HCL (ATARAX) 50 mg tablet Take 1 Tablet by mouth every eight (8) hours as needed for Anxiety. (Patient not taking: Reported on 10/31/2022) 20 Tablet 0     No Known Allergies  Past Medical History:   Diagnosis Date    Anxiety     Anxiety     Depression     Hypercholesterolemia      History reviewed. No pertinent surgical history.   Family History   Problem Relation Age of Onset    Hypertension Mother     No Known Problems Father      Social History     Tobacco Use    Smoking status: Every Day     Packs/day: 1.00     Types: Cigarettes    Smokeless tobacco: Never   Substance Use Topics    Alcohol use: Not Currently     Comment: 5 days per week, 2 beers      Lab Results   Component Value Date/Time    Cholesterol, total 243 (H) 01/27/2022 10:08 AM    HDL Cholesterol 72 01/27/2022 10:08 AM    LDL, calculated 139.2 (H) 01/27/2022 10:08 AM    Triglyceride 159 (H) 01/27/2022 10:08 AM    CHOL/HDL Ratio 3.4 01/27/2022 10:08 AM     Lab Results   Component Value Date/Time    ALT (SGPT) 30 12/15/2020 03:48 PM    Alk. phosphatase 86 12/15/2020 03:48 PM    Bilirubin, total 0.5 12/15/2020 03:48 PM    Albumin 3.6 12/15/2020 03:48 PM    Protein, total 7.5 12/15/2020 03:48 PM    PLATELET 993 24/50/5216 03:16 PM    Hepatitis B surface Ag <0.10 01/27/2022 10:08 AM        Review of Systems   Constitutional:  Negative for chills and fever. HENT:  Negative for congestion and nosebleeds. Eyes:  Negative for blurred vision and pain. Respiratory:  Negative for cough, shortness of breath and wheezing. Cardiovascular:  Negative for chest pain and leg swelling. Gastrointestinal:  Negative for constipation, diarrhea, nausea and vomiting. Genitourinary:  Negative for dysuria and frequency. Musculoskeletal:  Negative for joint pain and myalgias. Skin:  Negative for itching and rash. Neurological:  Negative for dizziness, loss of consciousness and headaches. Psychiatric/Behavioral:  Positive for depression. Negative for hallucinations, memory loss, substance abuse and suicidal ideas. The patient is nervous/anxious and has insomnia. Physical Exam  Vitals and nursing note reviewed. Constitutional:       Appearance: He is well-developed. HENT:      Head: Normocephalic and atraumatic. Mouth/Throat:      Pharynx: No oropharyngeal exudate.    Eyes:      Conjunctiva/sclera: Conjunctivae normal.      Pupils: Pupils are equal, round, and reactive to light. Neck:      Thyroid: No thyromegaly. Vascular: No JVD. Cardiovascular:      Rate and Rhythm: Normal rate and regular rhythm. Heart sounds: Normal heart sounds. No murmur heard. No friction rub. Pulmonary:      Effort: Pulmonary effort is normal. No respiratory distress. Breath sounds: Normal breath sounds. No wheezing or rales. Abdominal:      General: Bowel sounds are normal. There is no distension. Palpations: Abdomen is soft. Tenderness: There is no abdominal tenderness. Musculoskeletal:         General: No tenderness. Cervical back: Normal range of motion and neck supple. Lymphadenopathy:      Cervical: No cervical adenopathy. Skin:     General: Skin is warm. Findings: No erythema or rash. Neurological:      Mental Status: He is alert and oriented to person, place, and time. Deep Tendon Reflexes: Reflexes are normal and symmetric. Psychiatric:         Attention and Perception: Attention and perception normal. He is attentive. He does not perceive visual hallucinations. Mood and Affect: Mood is anxious and elated. Mood is not depressed. Affect is not labile, blunt, flat, angry, tearful or inappropriate. Speech: He is communicative. Speech is not rapid and pressured, delayed, slurred or tangential.         Behavior: Behavior is agitated. Behavior is not slowed, aggressive, withdrawn, hyperactive or combative. Thought Content: Thought content normal. Thought content is not paranoid. Thought content does not include homicidal or suicidal ideation. Cognition and Memory: Cognition and memory normal.         Judgment: Judgment normal.     ASSESSMENT and PLAN    ICD-10-CM ICD-9-CM    1. Recurrent depressive disorder (HCC)  F33.9 296.30 sertraline (Zoloft) 25 mg tablet      cloNIDine HCL (CATAPRES) 0.2 mg tablet      2.  Positive depression screening Z13.31 796.4 sertraline (Zoloft) 25 mg tablet      cloNIDine HCL (CATAPRES) 0.2 mg tablet      3. Dizzinesses  R42 780.4 REFERRAL TO CARDIOLOGY      4. Alcohol-induced insomnia (HCC)  F10.982 291.82 cloNIDine HCL (CATAPRES) 0.2 mg tablet      5.  Acute bilateral low back pain with sciatica, sciatica laterality unspecified  M54.40 724.2 methylPREDNISolone (MEDROL DOSEPACK) 4 mg tablet     724.3 diclofenac potassium (CATAFLAM) 50 mg tablet      XR SPINE LUMB 2 OR 3 V      Depression with anxiety in a stable condition, not suicidal,  medication's benefit outwt the side effects,  will reevaluate in 2 w for progression,     Counseling , social support, spiritual belonging, inc physical activity, ++ compliance advised, patient was told that should not mix any of current medication with any other illicit drugs, should not drink any alcoholic beverages while on such medication  patient acknowledged understanding and agreed with today's recommendation in addition patient was told to call for any concern    lab results and schedule of future lab studies reviewed with patient  reviewed diet, exercise and weight control

## 2022-11-03 ENCOUNTER — HOSPITAL ENCOUNTER (EMERGENCY)
Age: 37
Discharge: HOME | End: 2022-11-03
Attending: EMERGENCY MEDICINE
Payer: MEDICAID

## 2022-11-03 VITALS
TEMPERATURE: 99.2 F | HEART RATE: 80 BPM | OXYGEN SATURATION: 100 % | HEIGHT: 72 IN | DIASTOLIC BLOOD PRESSURE: 85 MMHG | RESPIRATION RATE: 16 BRPM | SYSTOLIC BLOOD PRESSURE: 121 MMHG | BODY MASS INDEX: 27.5 KG/M2 | WEIGHT: 203 LBS

## 2022-11-03 DIAGNOSIS — G89.29 CHRONIC MIDLINE LOW BACK PAIN WITH SCIATICA, SCIATICA LATERALITY UNSPECIFIED: Primary | ICD-10-CM

## 2022-11-03 DIAGNOSIS — R42 DIZZINESS: Primary | ICD-10-CM

## 2022-11-03 DIAGNOSIS — M54.40 CHRONIC MIDLINE LOW BACK PAIN WITH SCIATICA, SCIATICA LATERALITY UNSPECIFIED: Primary | ICD-10-CM

## 2022-11-03 LAB
ALBUMIN SERPL-MCNC: 3.6 G/DL (ref 3.5–5)
ALBUMIN/GLOB SERPL: 1 {RATIO} (ref 1.1–2.2)
ALP SERPL-CCNC: 94 U/L (ref 45–117)
ALT SERPL-CCNC: 30 U/L (ref 12–78)
ANION GAP SERPL CALC-SCNC: 2 MMOL/L (ref 5–15)
AST SERPL-CCNC: 14 U/L (ref 15–37)
BASOPHILS # BLD: 0 K/UL (ref 0–0.1)
BASOPHILS NFR BLD: 0 % (ref 0–1)
BILIRUB SERPL-MCNC: 0.9 MG/DL (ref 0.2–1)
BUN SERPL-MCNC: 16 MG/DL (ref 6–20)
BUN/CREAT SERPL: 14 (ref 12–20)
CALCIUM SERPL-MCNC: 9.2 MG/DL (ref 8.5–10.1)
CHLORIDE SERPL-SCNC: 106 MMOL/L (ref 97–108)
CO2 SERPL-SCNC: 29 MMOL/L (ref 21–32)
COMMENT, HOLDF: NORMAL
CREAT SERPL-MCNC: 1.13 MG/DL (ref 0.7–1.3)
DIFFERENTIAL METHOD BLD: ABNORMAL
EOSINOPHIL # BLD: 0.3 K/UL (ref 0–0.4)
EOSINOPHIL NFR BLD: 3 % (ref 0–7)
ERYTHROCYTE [DISTWIDTH] IN BLOOD BY AUTOMATED COUNT: 13.5 % (ref 11.5–14.5)
GLOBULIN SER CALC-MCNC: 3.7 G/DL (ref 2–4)
GLUCOSE SERPL-MCNC: 89 MG/DL (ref 65–100)
HCT VFR BLD AUTO: 43.9 % (ref 36.6–50.3)
HGB BLD-MCNC: 14 G/DL (ref 12.1–17)
IMM GRANULOCYTES # BLD AUTO: 0 K/UL (ref 0–0.04)
IMM GRANULOCYTES NFR BLD AUTO: 0 % (ref 0–0.5)
LYMPHOCYTES # BLD: 1.7 K/UL (ref 0.8–3.5)
LYMPHOCYTES NFR BLD: 22 % (ref 12–49)
MCH RBC QN AUTO: 28.6 PG (ref 26–34)
MCHC RBC AUTO-ENTMCNC: 31.9 G/DL (ref 30–36.5)
MCV RBC AUTO: 89.8 FL (ref 80–99)
MONOCYTES # BLD: 0.6 K/UL (ref 0–1)
MONOCYTES NFR BLD: 8 % (ref 5–13)
NEUTS SEG # BLD: 5.3 K/UL (ref 1.8–8)
NEUTS SEG NFR BLD: 67 % (ref 32–75)
NRBC # BLD: 0 K/UL (ref 0–0.01)
NRBC BLD-RTO: 0 PER 100 WBC
PLATELET # BLD AUTO: 343 K/UL (ref 150–400)
PMV BLD AUTO: 8.3 FL (ref 8.9–12.9)
POTASSIUM SERPL-SCNC: 4.1 MMOL/L (ref 3.5–5.1)
PROT SERPL-MCNC: 7.3 G/DL (ref 6.4–8.2)
RBC # BLD AUTO: 4.89 M/UL (ref 4.1–5.7)
SAMPLES BEING HELD,HOLD: NORMAL
SODIUM SERPL-SCNC: 137 MMOL/L (ref 136–145)
TROPONIN-HIGH SENSITIVITY: 6 NG/L (ref 0–76)
WBC # BLD AUTO: 7.9 K/UL (ref 4.1–11.1)

## 2022-11-03 PROCEDURE — 84484 ASSAY OF TROPONIN QUANT: CPT

## 2022-11-03 PROCEDURE — 36415 COLL VENOUS BLD VENIPUNCTURE: CPT

## 2022-11-03 PROCEDURE — 85025 COMPLETE CBC W/AUTO DIFF WBC: CPT

## 2022-11-03 PROCEDURE — 93005 ELECTROCARDIOGRAM TRACING: CPT

## 2022-11-03 PROCEDURE — 99284 EMERGENCY DEPT VISIT MOD MDM: CPT

## 2022-11-03 PROCEDURE — 80053 COMPREHEN METABOLIC PANEL: CPT

## 2022-11-03 NOTE — ED PROVIDER NOTES
EMERGENCY DEPARTMENT HISTORY AND PHYSICAL EXAM      Date: 11/3/2022  Patient Name: Todd Mayen  Patient Age and Sex: 40 y.o. male     History of Presenting Illness     Chief Complaint   Patient presents with    Dizziness     Pt arrives via EMS reports dizziness and feeling \"faint\" with blurred vision during exercise today that has now subsided. Patient reports he has hx of anxiety. Scheduled to see a cardiologist on 11/14       History Provided By: Patient    HPI: Todd Mayen is a 20-year-old history anxiety, hyperlipidemia, tobacco abuse presenting with episode of dizziness. Patient was at the gym when he had sudden onset of dizziness, sensation of feeling faint. He stopped to walk around, had bilateral blurry vision with this. Symptoms lasted approximately 30 minutes prior to resolving spontaneously. He denies any associated chest pain nausea or vomiting. He felt some unsteadiness with walking during this. Called EMS. At the time of EMS arrival symptoms had totally resolved. He reports at time of presentation he feels at his baseline. He has an appointment with cardiologist on the 14th after referral from his PCP. No family history of early cardiac death. History of similar episodes. No family history early ACS. There are no other complaints, changes, or physical findings at this time. PCP: Page Gil MD    No current facility-administered medications on file prior to encounter. Current Outpatient Medications on File Prior to Encounter   Medication Sig Dispense Refill    sertraline (Zoloft) 25 mg tablet Take 1 Tablet by mouth daily for 30 days. 30 Tablet 0    cloNIDine HCL (CATAPRES) 0.2 mg tablet Take 1 Tablet by mouth nightly. 30 Tablet 0    methylPREDNISolone (MEDROL DOSEPACK) 4 mg tablet As directed for 6 days one package 1 Dose Pack 0    diclofenac potassium (CATAFLAM) 50 mg tablet Take 1 Tablet by mouth three (3) times daily for 10 days.  30 Tablet 0    busPIRone (BUSPAR) 5 mg tablet Take 5 mg by mouth three (3) times daily. (Patient not taking: Reported on 10/31/2022)      hydrOXYzine HCL (ATARAX) 50 mg tablet Take 1 Tablet by mouth every eight (8) hours as needed for Anxiety. (Patient not taking: Reported on 10/31/2022) 20 Tablet 0       Past History     Past Medical History:  Past Medical History:   Diagnosis Date    Anxiety     Anxiety     Depression     Hypercholesterolemia        Past Surgical History:  No past surgical history on file. Family History:  Family History   Problem Relation Age of Onset    Hypertension Mother     No Known Problems Father        Social History:  Social History     Tobacco Use    Smoking status: Every Day     Packs/day: 1.00     Types: Cigarettes    Smokeless tobacco: Never   Vaping Use    Vaping Use: Never used   Substance Use Topics    Alcohol use: Not Currently     Comment: 5 days per week, 2 beers    Drug use: Not Currently     Types: Marijuana       Allergies:  No Known Allergies      Review of Systems   Review of Systems   Constitutional:  Negative for chills and fever. HENT:  Negative for congestion and rhinorrhea. Eyes:  Positive for visual disturbance. Respiratory:  Positive for shortness of breath. Cardiovascular:  Negative for chest pain. Gastrointestinal:  Negative for abdominal pain, diarrhea, nausea and vomiting. Genitourinary:  Negative for dysuria and frequency. Musculoskeletal:  Negative for myalgias. Skin:  Negative for rash. Neurological:  Positive for dizziness. Negative for weakness and numbness. All other systems reviewed and are negative. Physical Exam   Physical Exam  Vitals and nursing note reviewed. HENT:      Mouth/Throat:      Mouth: Mucous membranes are moist.   Eyes:      Conjunctiva/sclera: Conjunctivae normal.   Cardiovascular:      Rate and Rhythm: Normal rate and regular rhythm. Heart sounds: Normal heart sounds. No murmur heard. No gallop.       Comments: 2+ symmetric radial pulses, 2+ symmetric DP pulses  Pulmonary:      Effort: Pulmonary effort is normal.   Abdominal:      General: Abdomen is flat. Musculoskeletal:         General: No deformity. Right lower leg: No edema. Left lower leg: No edema. Skin:     General: Skin is warm and dry. Neurological:      Mental Status: He is alert and oriented to person, place, and time. Mental status is at baseline. Comments: Alert and oriented, speech is fluent and comprehensive  CN: Visual fields intact, extraocular movements normal, pupils equal round and reactive to light, face is symmetric, tongue and uvula midline, symmetric shrug, sensation intact to light touch bilaterally  Strength 5/5 bilateral upper and lower extremities  Sensation symmetric bilateral upper and lower extremities  Reflexes 2+ brachial and patellar  No ataxia finger-to-nose, gait, no nystagmus   Psychiatric:         Behavior: Behavior normal.         Thought Content: Thought content normal.        Diagnostic Study Results     Labs -     Recent Results (from the past 12 hour(s))   EKG, 12 LEAD, INITIAL    Collection Time: 11/03/22  3:43 PM   Result Value Ref Range    Ventricular Rate 89 BPM    Atrial Rate 89 BPM    P-R Interval 144 ms    QRS Duration 84 ms    Q-T Interval 340 ms    QTC Calculation (Bezet) 413 ms    Calculated P Axis 16 degrees    Calculated R Axis 66 degrees    Calculated T Axis 12 degrees    Diagnosis       Normal sinus rhythm  Nonspecific ST and T wave abnormality  When compared with ECG of 19-AUG-2022 07:35,  premature ventricular complexes are no longer present     CBC WITH AUTOMATED DIFF    Collection Time: 11/03/22  3:47 PM   Result Value Ref Range    WBC 7.9 4.1 - 11.1 K/uL    RBC 4.89 4. 10 - 5.70 M/uL    HGB 14.0 12.1 - 17.0 g/dL    HCT 43.9 36.6 - 50.3 %    MCV 89.8 80.0 - 99.0 FL    MCH 28.6 26.0 - 34.0 PG    MCHC 31.9 30.0 - 36.5 g/dL    RDW 13.5 11.5 - 14.5 %    PLATELET 974 564 - 933 K/uL    MPV 8.3 (L) 8.9 - 12.9 FL NRBC 0.0 0  WBC    ABSOLUTE NRBC 0.00 0.00 - 0.01 K/uL    NEUTROPHILS 67 32 - 75 %    LYMPHOCYTES 22 12 - 49 %    MONOCYTES 8 5 - 13 %    EOSINOPHILS 3 0 - 7 %    BASOPHILS 0 0 - 1 %    IMMATURE GRANULOCYTES 0 0.0 - 0.5 %    ABS. NEUTROPHILS 5.3 1.8 - 8.0 K/UL    ABS. LYMPHOCYTES 1.7 0.8 - 3.5 K/UL    ABS. MONOCYTES 0.6 0.0 - 1.0 K/UL    ABS. EOSINOPHILS 0.3 0.0 - 0.4 K/UL    ABS. BASOPHILS 0.0 0.0 - 0.1 K/UL    ABS. IMM. GRANS. 0.0 0.00 - 0.04 K/UL    DF AUTOMATED     METABOLIC PANEL, COMPREHENSIVE    Collection Time: 11/03/22  3:47 PM   Result Value Ref Range    Sodium 137 136 - 145 mmol/L    Potassium 4.1 3.5 - 5.1 mmol/L    Chloride 106 97 - 108 mmol/L    CO2 29 21 - 32 mmol/L    Anion gap 2 (L) 5 - 15 mmol/L    Glucose 89 65 - 100 mg/dL    BUN 16 6 - 20 MG/DL    Creatinine 1.13 0.70 - 1.30 MG/DL    BUN/Creatinine ratio 14 12 - 20      eGFR >60 >60 ml/min/1.73m2    Calcium 9.2 8.5 - 10.1 MG/DL    Bilirubin, total 0.9 0.2 - 1.0 MG/DL    ALT (SGPT) 30 12 - 78 U/L    AST (SGOT) 14 (L) 15 - 37 U/L    Alk. phosphatase 94 45 - 117 U/L    Protein, total 7.3 6.4 - 8.2 g/dL    Albumin 3.6 3.5 - 5.0 g/dL    Globulin 3.7 2.0 - 4.0 g/dL    A-G Ratio 1.0 (L) 1.1 - 2.2     SAMPLES BEING HELD    Collection Time: 11/03/22  3:47 PM   Result Value Ref Range    SAMPLES BEING HELD 1 PST     COMMENT        Add-on orders for these samples will be processed based on acceptable specimen integrity and analyte stability, which may vary by analyte. TROPONIN-HIGH SENSITIVITY    Collection Time: 11/03/22  4:50 PM   Result Value Ref Range    Troponin-High Sensitivity 6 0 - 76 ng/L       Radiologic Studies -   No orders to display     CT Results  (Last 48 hours)      None          CXR Results  (Last 48 hours)      None              Medical Decision Making   I am the first provider for this patient.     I reviewed the vital signs, available nursing notes, past medical history, past surgical history, family history and social history. Vital Signs-Reviewed the patient's vital signs. Patient Vitals for the past 12 hrs:   Temp Pulse Resp BP SpO2   11/03/22 1947 -- 80 16 121/85 100 %   11/03/22 1529 99.2 °F (37.3 °C) 99 18 125/78 98 %       Records Reviewed: Nursing Notes and Old Medical Records    Provider Notes (Medical Decision Making):   DDx BPPV, consider dysrhythmia however lower suspicion, consider structural heart disease however normal heart sounds on arrival.    Will obtain EKG to evaluate for conduction abnormality, troponin CBC CMP. Negative evaluation in the ED, no dysrhythmias noted on monitor will discharge with cardiology follow-up as scheduled. ED Course:   Initial assessment performed. The patients presenting problems have been discussed, and they are in agreement with the care plan formulated and outlined with them. I have encouraged them to ask questions as they arise throughout their visit. ED Course as of 11/03/22 2311   Thu Nov 03, 2022   1627 Patient has cardiology appointment 11 days from today [WB]   1647 EKG shows normal sinus rhythm rate of 89 normal axis normal intervals. No STEMI no peak T waves. There is QRS notching at V4 and V3, very borderline ST elevation in those leads concerning for possible benign early repolarization changes. [WB]   1648 CBC shows normal counts normal differential, CMP normal electrolytes renal function LFTs, pending troponin [WB]   1844 Troponin 6, patient is safe for discharge home [WB]   1922 Patient feels well, updated on findings, will plan discharge with scheduled cardiology follow-up [WB]      ED Course User Index  [WB] Sherrie Domingo MD     Disposition:  Discharge Note:  The patient has been re-evaluated and is ready for discharge. Reviewed available results with patient. Counseled patient on diagnosis and care plan. Patient has expressed understanding, and all questions have been answered.  Patient agrees with plan and agrees to follow up as recommended, or to return to the ED if their symptoms worsen. Discharge instructions have been provided and explained to the patient, along with reasons to return to the ED. PLAN:  Discharge Medication List as of 11/3/2022  7:22 PM        2. Follow-up Information       Follow up With Specialties Details Why Contact Info    Follow-up as scheduled with cardiology              3.  Return to ED if worse     Diagnosis     Clinical Impression:   1. Dizziness        Attestations:    Isiah Vasquez M.D. Please note that this dictation was completed with coin4ce, the computer voice recognition software. Quite often unanticipated grammatical, syntax, homophones, and other interpretive errors are inadvertently transcribed by the computer software. Please disregard these errors. Please excuse any errors that have escaped final proofreading. Thank you.

## 2022-11-04 LAB
ATRIAL RATE: 89 BPM
CALCULATED P AXIS, ECG09: 16 DEGREES
CALCULATED R AXIS, ECG10: 66 DEGREES
CALCULATED T AXIS, ECG11: 12 DEGREES
DIAGNOSIS, 93000: NORMAL
P-R INTERVAL, ECG05: 144 MS
Q-T INTERVAL, ECG07: 340 MS
QRS DURATION, ECG06: 84 MS
QTC CALCULATION (BEZET), ECG08: 413 MS
VENTRICULAR RATE, ECG03: 89 BPM

## 2022-12-07 DIAGNOSIS — F33.9 RECURRENT DEPRESSIVE DISORDER (HCC): ICD-10-CM

## 2022-12-07 DIAGNOSIS — F10.982 ALCOHOL-INDUCED INSOMNIA (HCC): ICD-10-CM

## 2022-12-07 DIAGNOSIS — Z13.31 POSITIVE DEPRESSION SCREENING: ICD-10-CM

## 2022-12-09 RX ORDER — CLONIDINE HYDROCHLORIDE 0.2 MG/1
TABLET ORAL
Qty: 30 TABLET | Refills: 0 | Status: SHIPPED | OUTPATIENT
Start: 2022-12-09

## 2022-12-09 RX ORDER — SERTRALINE HYDROCHLORIDE 25 MG/1
TABLET, FILM COATED ORAL
Qty: 30 TABLET | Refills: 0 | Status: SHIPPED | OUTPATIENT
Start: 2022-12-09

## 2023-01-13 ENCOUNTER — HOSPITAL ENCOUNTER (EMERGENCY)
Age: 38
Discharge: HOME | End: 2023-01-13
Attending: EMERGENCY MEDICINE
Payer: MEDICAID

## 2023-01-13 VITALS
HEART RATE: 101 BPM | TEMPERATURE: 99.2 F | DIASTOLIC BLOOD PRESSURE: 95 MMHG | OXYGEN SATURATION: 96 % | SYSTOLIC BLOOD PRESSURE: 153 MMHG | BODY MASS INDEX: 29.12 KG/M2 | WEIGHT: 215 LBS | RESPIRATION RATE: 18 BRPM | HEIGHT: 72 IN

## 2023-01-13 DIAGNOSIS — R51.9 NONINTRACTABLE HEADACHE, UNSPECIFIED CHRONICITY PATTERN, UNSPECIFIED HEADACHE TYPE: Primary | ICD-10-CM

## 2023-01-13 DIAGNOSIS — R09.81 SINUS CONGESTION: ICD-10-CM

## 2023-01-13 DIAGNOSIS — F48.9 TENSION: ICD-10-CM

## 2023-01-13 DIAGNOSIS — R03.0 ELEVATED BP WITHOUT DIAGNOSIS OF HYPERTENSION: ICD-10-CM

## 2023-01-13 PROCEDURE — 74011250637 HC RX REV CODE- 250/637: Performed by: EMERGENCY MEDICINE

## 2023-01-13 PROCEDURE — 99283 EMERGENCY DEPT VISIT LOW MDM: CPT

## 2023-01-13 RX ORDER — BUTALBITAL, ACETAMINOPHEN AND CAFFEINE 50; 325; 40 MG/1; MG/1; MG/1
1 TABLET ORAL
Qty: 30 TABLET | Refills: 0 | Status: SHIPPED | OUTPATIENT
Start: 2023-01-13 | End: 2023-01-13 | Stop reason: SDUPTHER

## 2023-01-13 RX ORDER — IBUPROFEN 400 MG/1
800 TABLET ORAL
Status: COMPLETED | OUTPATIENT
Start: 2023-01-13 | End: 2023-01-13

## 2023-01-13 RX ORDER — AZITHROMYCIN 250 MG/1
TABLET, FILM COATED ORAL
Qty: 6 TABLET | Refills: 0 | Status: SHIPPED | OUTPATIENT
Start: 2023-01-13

## 2023-01-13 RX ORDER — BUTALBITAL, ACETAMINOPHEN AND CAFFEINE 50; 325; 40 MG/1; MG/1; MG/1
1 TABLET ORAL
Qty: 30 TABLET | Refills: 0 | Status: SHIPPED | OUTPATIENT
Start: 2023-01-13

## 2023-01-13 RX ADMIN — IBUPROFEN 800 MG: 400 TABLET, FILM COATED ORAL at 21:26

## 2023-01-14 NOTE — ED NOTES

## 2023-01-14 NOTE — ED PROVIDER NOTES
[facility] EMERGENCY DEPT  EMERGENCY DEPARTMENT ENCOUNTER       Pt Name: Verito Messer  MRN: [id number]  [place] 1985  Date of evaluation: 1/13/2023  Provider: Ying Bae MD   PCP: Kennedy Floyd MD  Note Started: 9:06 PM 1/13/23     CHIEF COMPLAINT       Chief Complaint   Patient presents with    Headache        HISTORY OF PRESENT ILLNESS: 1 or more elements      History From: ***, History limited by: ***none     Verito Messer is a 40 y.o. male who presents to [facility] to the ED complaining of 2 weeks of headaches which is gotten worse the last 2 days. Yesterday he had a episode of pressure in his forehead. Describes associated neck pain nausea. Headache is slightly worse with light or noise. Slightly alleviated by Excedrin but never completely goes away. He reports that yesterday when he had pressure in his forehead he had a period where he could not concentrate and felt dizzy, though he notes that he has a history of anxiety and he believes he was anxious at the time. He does have a history of migraines and states that this headache is not as bad as his previous migraines but it has lasted longer. Regarding the pressure in his forehead he does have seasonal allergies and chronic congestion. Patient notes that he has been under much stress lately in terms of housing and having to move. He denies fever, sore throat, vomiting, cough, shortness of breath, rash. Review of systems admits to joint pain but states this is chronic and that he has been told he has arthritis. Nursing Notes were all reviewed and agreed with or any disagreements were addressed in the HPI. REVIEW OF SYSTEMS        Positives and Pertinent negatives as per HPI. PAST HISTORY     Past Medical History:  Past Medical History:   Diagnosis Date    Anxiety     Anxiety     Depression     Hypercholesterolemia        Past Surgical History:  No past surgical history on file.     Family History:  Family History Problem Relation Age of Onset    Hypertension Mother     No Known Problems Father        Social History:  Social History     Tobacco Use    Smoking status: Every Day     Packs/day: 1.00     Types: Cigarettes    Smokeless tobacco: Never   Vaping Use    Vaping Use: Never used   Substance Use Topics    Alcohol use: Not Currently     Comment: 5 days per week, 2 beers    Drug use: Not Currently     Types: Marijuana       Allergies:  No Known Allergies    CURRENT MEDICATIONS      Previous Medications    BUSPIRONE (BUSPAR) 5 MG TABLET    Take 5 mg by mouth three (3) times daily. CLONIDINE HCL (CATAPRES) 0.2 MG TABLET    TAKE 1 TABLET BY MOUTH EVERY NIGHT    HYDROXYZINE HCL (ATARAX) 50 MG TABLET    Take 1 Tablet by mouth every eight (8) hours as needed for Anxiety. METHYLPREDNISOLONE (MEDROL DOSEPACK) 4 MG TABLET    As directed for 6 days one package    SERTRALINE (ZOLOFT) 25 MG TABLET    TAKE 1 TABLET BY MOUTH DAILY       SCREENINGS               No data recorded         PHYSICAL EXAM      ED Triage Vitals [01/13/23 2042]   ED Encounter Vitals Group      BP (!) 143/94      Pulse (Heart Rate) (!) 111      Resp Rate 18      Temp 99.2 °F (37.3 °C)      Temp src       O2 Sat (%) 97 %      Weight 215 lb      Height 6'        Physical Exam     DIAGNOSTIC RESULTS   LABS:     No results found for this or any previous visit (from the past 12 hour(s)). EKG: If performed, independent interpretation documented below in the MDM section     RADIOLOGY:  Non-plain film images such as CT, Ultrasound and MRI are read by the radiologist. Plain radiographic images are visualized and preliminarily interpreted by the ED Provider with the findings documented in the MDM section. Interpretation per the Radiologist below, if available at the time of this note:     No results found.       PROCEDURES   Unless otherwise noted below, none  Procedures       EMERGENCY DEPARTMENT COURSE and DIFFERENTIAL DIAGNOSIS/MDM   Vitals: Vitals:    01/13/23 2042   BP: (!) 143/94   Pulse: (!) 111   Resp: 18   Temp: 99.2 °F (37.3 °C)   SpO2: 97%   Weight: 97.5 kg (215 lb)   Height: 6' (1.829 m)        Patient was given the following medications:  Medications - No data to display    Medical Decision Making  No meningismus. No neurodeficits or signs of intracranial bleed/mass. Suspect combination tension/stress, migraine, sinus headache. Could be early URI/sinusitis given mildly elevated temp and heart rate. We will manage supportively. BP mildly elevated in ED. Patient has never been diagnosed with elevated blood pressure. To follow-up with PCP for BP recheck    Risk  Prescription drug management. **PLEASE SEE ED COURSE DETAILS BELOW FOR FURTHER MDM DETAILS:  ED Course as of 01/13/23 2149   Radha Amado Jan 13, 2023 2112 Was already discharged, patient told nurse he has pressure behind his eyes as well as recent ear pain. Given low-grade temp, very likely this is early sinusitis. Discussed patient that going to give him prescription for antibiotics should those symptoms worsen. [SS]      ED Course User Index  [SS] Zora Carrel, MD         FINAL IMPRESSION   No diagnosis found. DISPOSITION/PLAN   Gabbi White's  results have been reviewed with him. He has been counseled regarding his diagnosis, treatment, and plan. He verbally conveys understanding and agreement of the signs, symptoms, diagnosis, treatment and prognosis and additionally agrees to follow up as discussed. He also agrees with the care-plan and conveys that all of his questions have been answered. I have also provided discharge instructions for him that include: educational information regarding their diagnosis and treatment, and list of reasons why they would want to return to the ED prior to their follow-up appointment, should his condition change.         PATIENT REFERRED TO:  Follow-up Information    None           DISCHARGE MEDICATIONS:  Current Discharge Medication List            DISCONTINUED MEDICATIONS:  Current Discharge Medication List          I am the Primary Clinician of Record. Liudmila Stuart MD (electronically signed)    (Please note that parts of this dictation were completed with voice recognition software. Quite often unanticipated grammatical, syntax, homophones, and other interpretive errors are inadvertently transcribed by the computer software. Please disregards these errors.  Please excuse any errors that have escaped final proofreading.) tablet TAKE 1 TABLET BY MOUTH EVERY NIGHT, Normal, Disp-30 Tablet, R-0      sertraline (ZOLOFT) 25 mg tablet TAKE 1 TABLET BY MOUTH DAILY, Normal, Disp-30 Tablet, R-0      methylPREDNISolone (MEDROL DOSEPACK) 4 mg tablet As directed for 6 days one package, Normal, Disp-1 Dose Pack, R-0      busPIRone (BUSPAR) 5 mg tablet Take 5 mg by mouth three (3) times daily. , Historical Med      hydrOXYzine HCL (ATARAX) 50 mg tablet Take 1 Tablet by mouth every eight (8) hours as needed for Anxiety., Normal, Disp-20 Tablet, R-0               DISCONTINUED MEDICATIONS:  Discharge Medication List as of 1/13/2023  9:13 PM          I am the Primary Clinician of Record. Shira Vidales MD (electronically signed)    (Please note that parts of this dictation were completed with voice recognition software. Quite often unanticipated grammatical, syntax, homophones, and other interpretive errors are inadvertently transcribed by the computer software. Please disregards these errors.  Please excuse any errors that have escaped final proofreading.)

## 2023-01-14 NOTE — ED TRIAGE NOTES
Chief Complaint   Patient presents with    Headache     Patient presents to the ED ambulatory c/o headaches x 2 weeks. Patient reports that he has been taking Excedrin with minimal relief and over the last 2 days the pain has worsened. Last dose was approx 7 hours ago. Notes that he had chronic migraines as a child, but was never Rx any medications.

## 2023-01-14 NOTE — ED NOTES
Discharge instructions were given to the patient by Ketty Francis RN. The patient left the Emergency Department ambulatory, alert and oriented and in no acute distress with 2 prescriptions. The patient was encouraged to call or return to the ED for worsening issues or problems and was encouraged to schedule a follow up appointment for continuing care. The patient verbalized understanding of discharge instructions and prescriptions, all questions were answered. The patient has no further concerns at this time.

## 2023-01-19 ENCOUNTER — HOSPITAL ENCOUNTER (EMERGENCY)
Age: 38
Discharge: HOME | End: 2023-01-19
Attending: EMERGENCY MEDICINE
Payer: MEDICAID

## 2023-01-19 VITALS
HEIGHT: 72 IN | BODY MASS INDEX: 29.05 KG/M2 | DIASTOLIC BLOOD PRESSURE: 80 MMHG | RESPIRATION RATE: 20 BRPM | OXYGEN SATURATION: 95 % | WEIGHT: 214.51 LBS | SYSTOLIC BLOOD PRESSURE: 143 MMHG | HEART RATE: 102 BPM | TEMPERATURE: 97.9 F

## 2023-01-19 DIAGNOSIS — Z72.0 TOBACCO USE: ICD-10-CM

## 2023-01-19 DIAGNOSIS — F41.1 ANXIETY STATE: ICD-10-CM

## 2023-01-19 DIAGNOSIS — F41.0 PANIC ATTACK: Primary | ICD-10-CM

## 2023-01-19 PROCEDURE — 99283 EMERGENCY DEPT VISIT LOW MDM: CPT

## 2023-01-19 NOTE — ED NOTES
Discharge instructions were given to the patient by Hernan Taylor RN. The patient left the Emergency Department ambulatory, alert and oriented and in no acute distress with 0 prescriptions. The patient was encouraged to call or return to the ED for worsening issues or problems and was encouraged to schedule a follow up appointment for continuing care. The patient verbalized understanding of discharge instructions and prescriptions, all questions were answered. The patient has no further concerns at this time.

## 2023-01-19 NOTE — ED TRIAGE NOTES
Pt presents via EMS for anxiety. Pt reports having a panic attack that lasted about 20 minutes PTA.  Pt reports feeling a little better now

## 2023-01-19 NOTE — ED NOTES
Pt presents ambulatory to ED complaining of having a bad panic attack about 30 minutes PTA. Pt reports he does not know exactly what caused the attack and states he had just finished working out and making lunch. Pt reports he was trying to slow his breathing and started over thinking it, which made it worse. Pt states he started to become dizzy and started to try his usual methods to decrease his anxiety, which he reports did not work. Pt allegedly went out into the hallway at his apartment and was screaming and knocking on doors asking for help. Upon arrival to ED, pt reports he is feeling a lot better. Pt states he sees a psychiatrist and two therapists on a regular basis. Pt is currently taking Remeron for depression and anxiety. Pt reports he takes it as directed. Pt is alert and oriented x 4, RR even and unlabored, skin is warm and dry. Assesment completed and pt updated on plan of care. Emergency Department Nursing Plan of Care       The Nursing Plan of Care is developed from the Nursing assessment and Emergency Department Attending provider initial evaluation. The plan of care may be reviewed in the ED Provider note.     The Plan of Care was developed with the following considerations:   Patient / Family readiness to learn indicated by:verbalized understanding  Persons(s) to be included in education: patient  Barriers to Learning/Limitations:No    Signed     Ivory Lane RN    1/19/2023   2:48 PM

## 2023-01-19 NOTE — ED PROVIDER NOTES
[facility] EMERGENCY DEPT  EMERGENCY DEPARTMENT ENCOUNTER       Pt Name: Angelita Carvajal  MRN: [id number]  [place] 1985  Date of evaluation: 1/19/2023  Provider: Martha Case PA-C   PCP: Zane Moore MD  Note Started: 3:05 PM 1/19/23     CHIEF COMPLAINT       Chief Complaint   Patient presents with    Anxiety        HISTORY OF PRESENT ILLNESS: 1 or more elements      History From: Patient  HPI Limitations : None     Angelita Carvajal is a 40 y.o. male with medical history significant for anxiety, depression, hypercholesterolemia, tobacco abuse who presents via EMS with complaints of acute panic attack just prior to arrival.  States that he was at the gym doing a HIT workout when he started to have trouble catching his breath and then he began to panic. States that symptoms lasted approximately 20 minutes before starting to resolve on their own. No interventions prior to arrival.  History of similar panic attacks and anxiety in the past, although this 1 was different because it lasted a little bit longer and did not resolve immediately once he changed his scenery. States he is followed by therapist.  No fever, chills, nausea, vomiting, chest pain, lightheadedness, dizziness, syncope, seizure, palpitations. No persistent shortness of breath. States he feels \"great\" at this time. Nursing Notes were all reviewed and agreed with or any disagreements were addressed in the HPI. REVIEW OF SYSTEMS      Review of Systems   Constitutional:  Negative for activity change, appetite change, chills, diaphoresis, fatigue and fever. HENT:  Negative for congestion, dental problem, ear discharge, ear pain, facial swelling, postnasal drip, sinus pressure and sneezing. Eyes:  Negative for photophobia, pain, redness and visual disturbance. Respiratory:  Positive for shortness of breath. Negative for cough, chest tightness and wheezing.     Cardiovascular:  Negative for chest pain, palpitations and leg swelling. Gastrointestinal:  Negative for abdominal pain, constipation, diarrhea, nausea and vomiting. Genitourinary: Negative. Musculoskeletal: Negative. Skin: Negative. Negative for pallor and rash. Neurological:  Negative for dizziness, syncope, facial asymmetry, weakness, numbness and headaches. Psychiatric/Behavioral:  Negative for behavioral problems, confusion, decreased concentration, dysphoric mood, hallucinations, self-injury, sleep disturbance and suicidal ideas. The patient is nervous/anxious. The patient is not hyperactive. Positives and Pertinent negatives as per HPI. PAST HISTORY     Past Medical History:  Past Medical History:   Diagnosis Date    Anxiety     Anxiety     Depression     Hypercholesterolemia        Past Surgical History:  History reviewed. No pertinent surgical history. Family History:  Family History   Problem Relation Age of Onset    Hypertension Mother     No Known Problems Father        Social History:  Social History     Tobacco Use    Smoking status: Every Day     Packs/day: 1.00     Types: Cigarettes    Smokeless tobacco: Never   Vaping Use    Vaping Use: Never used   Substance Use Topics    Alcohol use: Not Currently     Comment: 5 days per week, 2 beers    Drug use: Not Currently     Types: Marijuana       Allergies:  No Known Allergies    CURRENT MEDICATIONS      Discharge Medication List as of 1/19/2023  2:52 PM        CONTINUE these medications which have NOT CHANGED    Details   azithromycin (Zithromax Z-Jax) 250 mg tablet 2 tabs by mouth day 1, then 1 tab by mouth daily on days 2-5, Normal, Disp-6 Tablet, R-0      butalbital-acetaminophen-caffeine (FIORICET, ESGIC) -40 mg per tablet Take 1 Tablet by mouth every six (6) hours as needed for Headache.  Indications: a migraine headache, Normal, Disp-30 Tablet, R-0      cloNIDine HCL (CATAPRES) 0.2 mg tablet TAKE 1 TABLET BY MOUTH EVERY NIGHT, Normal, Disp-30 Tablet, R-0      sertraline (ZOLOFT) 25 mg tablet TAKE 1 TABLET BY MOUTH DAILY, Normal, Disp-30 Tablet, R-0      methylPREDNISolone (MEDROL DOSEPACK) 4 mg tablet As directed for 6 days one package, Normal, Disp-1 Dose Pack, R-0      busPIRone (BUSPAR) 5 mg tablet Take 5 mg by mouth three (3) times daily. , Historical Med      hydrOXYzine HCL (ATARAX) 50 mg tablet Take 1 Tablet by mouth every eight (8) hours as needed for Anxiety., Normal, Disp-20 Tablet, R-0             SCREENINGS               No data recorded         PHYSICAL EXAM      ED Triage Vitals [01/19/23 1422]   ED Encounter Vitals Group      BP (!) 143/80      Pulse (Heart Rate) (!) 102      Resp Rate 20      Temp 97.9 °F (36.6 °C)      Temp src       O2 Sat (%) 95 %      Weight 214 lb 8.1 oz      Height 6'        Physical Exam  Vitals and nursing note reviewed. Constitutional:       General: He is not in acute distress. Appearance: Normal appearance. He is well-developed. He is not ill-appearing, toxic-appearing or diaphoretic. Comments: Well-appearing male sitting upright in bed in no apparent distress. Speaking in clear complete sentences. Smiling on exam.   HENT:      Head: Normocephalic and atraumatic. Right Ear: Hearing and external ear normal.      Left Ear: Hearing and external ear normal.      Nose: Nose normal.      Mouth/Throat:      Mouth: Mucous membranes are moist.   Eyes:      Extraocular Movements: Extraocular movements intact. Conjunctiva/sclera: Conjunctivae normal.   Cardiovascular:      Rate and Rhythm: Regular rhythm. Tachycardia present. Pulses: Normal pulses. Radial pulses are 2+ on the right side and 2+ on the left side. Heart sounds: Normal heart sounds, S1 normal and S2 normal.   Pulmonary:      Effort: Pulmonary effort is normal. No tachypnea, accessory muscle usage or respiratory distress. Breath sounds: Normal breath sounds. No stridor. No wheezing, rhonchi or rales. Chest:      Chest wall: No tenderness. Abdominal:      Palpations: Abdomen is soft. Tenderness: There is no abdominal tenderness. Musculoskeletal:         General: Normal range of motion. Cervical back: Normal range of motion. Skin:     General: Skin is warm and dry. Capillary Refill: Capillary refill takes less than 2 seconds. Coloration: Skin is not pale. Neurological:      Mental Status: He is alert and oriented to person, place, and time. Psychiatric:         Attention and Perception: Attention normal.         Mood and Affect: Mood normal.         Speech: Speech normal.         Behavior: Behavior normal.         Thought Content: Thought content normal.         Judgment: Judgment normal.       Pulse Oximetry Analysis - Normal 95% on RA       DIAGNOSTIC RESULTS   LABS:     No results found for this or any previous visit (from the past 12 hour(s)). EKG: When ordered, EKG's are interpreted by the Emergency Department Physician in the absence of a cardiologist.  Please see their note for interpretation of EKG. RADIOLOGY:  Non-plain film images such as CT, Ultrasound and MRI are read by the radiologist. Plain radiographic images are visualized and preliminarily interpreted by the ED Provider with the below findings:          Interpretation per the Radiologist below, if available at the time of this note:     No results found. PROCEDURES   Unless otherwise noted below, none  Procedures     CRITICAL CARE TIME   none    EMERGENCY DEPARTMENT COURSE and DIFFERENTIAL DIAGNOSIS/MDM   Initial assessment performed. The patients presenting problems have been discussed, and they are in agreement with the care plan formulated and outlined with them. I have encouraged them to ask questions as they arise throughout their visit.     Vitals:    Vitals:    01/19/23 1422   BP: (!) 143/80   Pulse: (!) 102   Resp: 20   Temp: 97.9 °F (36.6 °C)   SpO2: 95%   Weight: 97.3 kg (214 lb 8.1 oz)   Height: 6' (1.829 m)        Patient was given the following medications:  Medications - No data to display    CONSULTS: (Who and What was discussed)  None      Chronic Conditions: anxiety, depression, hypercholesterolemia    Social Determinants affecting Dx or Tx: None    Records Reviewed (source and summary): Nursing Notes, Old Medical Records, Previous Radiology Studies, and Previous Laboratory Studies    CC/HPI Summary, DDx, ED Course, and Reassessment: Well-appearing afebrile and hemodynamically stable 27-year-old male presents secondary to a panic attack while at the gym earlier. States that he had shortness of breath and anxiety during workout. Symptoms significantly resolved prior to arrival.  On exam, patient is mildly tachycardic but otherwise unremarkable exam with no adventitious lung sounds. States that he is feeling well. States he is ready for discharge. Suspect anxiety/panic attack. Differential includes depression, substance abuse, electrolyte abnormality, dehydration. No signs/concerns for ACS or significant cardiac/pulmonary pathology. Disposition Considerations (Tests not done, Shared Decision Making, Pt Expectation of Test or Tx.):      Progress Note:   Updated pt on all returned results and findings. Discussed the importance of proper follow up as referred below along with return precautions. Pt in agreement with the care plan and expresses agreement with and understanding of all items discussed. FINAL IMPRESSION     1. Panic attack    2. Anxiety state    3. Tobacco use          DISPOSITION/PLAN   Gabbi White's  results have been reviewed with him. He has been counseled regarding his diagnosis, treatment, and plan. He verbally conveys understanding and agreement of the signs, symptoms, diagnosis, treatment and prognosis and additionally agrees to follow up as discussed. He also agrees with the care-plan and conveys that all of his questions have been answered.   I have also provided discharge instructions for him that include: educational information regarding their diagnosis and treatment, and list of reasons why they would want to return to the ED prior to their follow-up appointment, should his condition change. Discharged    3:06 PM  I have discussed with patient their diagnosis, treatment, and follow up plan. The patient agrees to follow up as outlined in discharge paperwork and also to return to the ED with any worsening. Jacki Baker PA-C         PATIENT REFERRED TO:  Follow-up Information       Follow up With Specialties Details Why Contact Info    Antonio Rabago MD Family Medicine Schedule an appointment as soon as possible for a visit in 2 days As needed [address]  Schedule an appointment as soon as possible for a visit  As needed [address] [facility]  [phone]    [facility] EMERGENCY DEPT Emergency Medicine Go to  As needed, If symptoms worsen [address]:  Discharge Medication List as of 1/19/2023  2:52 PM            DISCONTINUED MEDICATIONS:  Discharge Medication List as of 1/19/2023  2:52 PM          Shared Not Shared KD: I have seen and evaluated the patient. My supervision physician was available for consultation. I am the Primary Clinician of Record. Jacki Baker PA-C (electronically signed)    (Please note that parts of this dictation were completed with voice recognition software. Quite often unanticipated grammatical, syntax, homophones, and other interpretive errors are inadvertently transcribed by the computer software. Please disregards these errors.  Please excuse any errors that have escaped final proofreading.)

## 2023-01-19 NOTE — DISCHARGE INSTRUCTIONS
It was a pleasure taking care of you at [facility] Emergency Department today. We know that when you come to [facility], you are entrusting us with your health, comfort, and safety. Our physicians and nurses honor that trust, and we truly appreciate the opportunity to care for you and your loved ones. We also value our feedback. If you receive a survey about your Emergency Department experience today, please fill it out. We care about our patients' feedback, and we listen to what you have to say. Thank you!

## 2023-01-23 ENCOUNTER — OFFICE VISIT (OUTPATIENT)
Dept: FAMILY MEDICINE CLINIC | Age: 38
End: 2023-01-23

## 2023-01-23 VITALS
BODY MASS INDEX: 30.34 KG/M2 | RESPIRATION RATE: 21 BRPM | TEMPERATURE: 98 F | OXYGEN SATURATION: 98 % | SYSTOLIC BLOOD PRESSURE: 127 MMHG | HEIGHT: 72 IN | WEIGHT: 224 LBS | DIASTOLIC BLOOD PRESSURE: 87 MMHG | HEART RATE: 92 BPM

## 2023-01-23 DIAGNOSIS — Z23 ENCOUNTER FOR IMMUNIZATION: ICD-10-CM

## 2023-01-23 DIAGNOSIS — F33.1 RECURRENT DEPRESSIVE DISORDER, CURRENT EPISODE MODERATE (HCC): ICD-10-CM

## 2023-01-23 DIAGNOSIS — R09.89 CHRONIC SINUS COMPLAINTS: ICD-10-CM

## 2023-01-23 DIAGNOSIS — Z00.00 MEDICARE ANNUAL WELLNESS VISIT, SUBSEQUENT: Primary | ICD-10-CM

## 2023-01-23 DIAGNOSIS — Z11.3 SCREEN FOR STD (SEXUALLY TRANSMITTED DISEASE): ICD-10-CM

## 2023-01-23 DIAGNOSIS — E78.2 MIXED HYPERLIPIDEMIA: ICD-10-CM

## 2023-01-23 DIAGNOSIS — Z83.3 FAMILY HISTORY OF DIABETES MELLITUS (DM): ICD-10-CM

## 2023-01-23 DIAGNOSIS — R42 DIZZINESS, NONSPECIFIC: ICD-10-CM

## 2023-01-23 PROCEDURE — G0439 PPPS, SUBSEQ VISIT: HCPCS | Performed by: FAMILY MEDICINE

## 2023-01-23 RX ORDER — MELOXICAM 15 MG/1
TABLET ORAL
COMMUNITY
Start: 2022-11-10

## 2023-01-23 RX ORDER — MOMETASONE FUROATE 50 UG/1
2 SPRAY, METERED NASAL DAILY
Qty: 1 EACH | Refills: 4 | Status: SHIPPED | OUTPATIENT
Start: 2023-01-23

## 2023-01-23 RX ORDER — CYCLOBENZAPRINE HCL 5 MG
TABLET ORAL
COMMUNITY
Start: 2022-11-10

## 2023-01-23 NOTE — PROGRESS NOTES
This is the Subsequent Medicare Annual Wellness Exam, performed 12 months or more after the Initial AWV or the last Subsequent AWV    I have reviewed the patient's medical history in detail and updated the computerized patient record. Assessment/Plan   Education and counseling provided:  Are appropriate based on today's review and evaluation  Influenza Vaccine  Diabetes screening test    1. Medicare annual wellness visit, subsequent  2. Encounter for immunization  -     TDAP, BOOSTRIX, (AGE 10 YRS+), IM  3. Mixed hyperlipidemia  -     CBC W/O DIFF; Future  -     LIPID PANEL; Future  -     METABOLIC PANEL, COMPREHENSIVE; Future  -     HEMOGLOBIN A1C WITH EAG; Future  -     TSH 3RD GENERATION; Future  -     T4, FREE; Future  -     FERRITIN; Future  4. Screen for STD (sexually transmitted disease)  -     URINALYSIS W/ RFLX MICROSCOPIC; Future  -     HIV 1/2 AG/AB, 4TH GENERATION,W RFLX CONFIRM; Future  -     RPR; Future  -     CT/NG/T.VAGINALIS AMPLIFICATION; Future  5. Family history of diabetes mellitus (DM)  -     CBC W/O DIFF; Future  -     LIPID PANEL; Future  -     METABOLIC PANEL, COMPREHENSIVE; Future  -     HEMOGLOBIN A1C WITH EAG; Future  -     TSH 3RD GENERATION; Future  -     T4, FREE; Future  -     FERRITIN; Future  6. Recurrent depressive disorder, current episode moderate (HCC)  -     CBC W/O DIFF; Future  -     LIPID PANEL; Future  -     METABOLIC PANEL, COMPREHENSIVE; Future  -     HEMOGLOBIN A1C WITH EAG; Future  -     TSH 3RD GENERATION; Future  -     T4, FREE; Future  -     FERRITIN; Future  7. Dizziness, nonspecific  -     REFERRAL TO CARDIOLOGY  8. Chronic sinus complaints  -     mometasone (NASONEX) 50 mcg/actuation nasal spray; 2 Sprays by Both Nostrils route daily.  Indications: allergic rhinitis prevention, Normal, Disp-1 Each, R-4       Depression Risk Factor Screening     3 most recent PHQ Screens 1/23/2023   Little interest or pleasure in doing things Not at all   Feeling down, depressed, irritable, or hopeless Not at all   Total Score PHQ 2 0   Trouble falling or staying asleep, or sleeping too much -   Feeling tired or having little energy -   Poor appetite, weight loss, or overeating -   Feeling bad about yourself - or that you are a failure or have let yourself or your family down -   Trouble concentrating on things such as school, work, reading, or watching TV -   Moving or speaking so slowly that other people could have noticed; or the opposite being so fidgety that others notice -   Thoughts of being better off dead, or hurting yourself in some way -   PHQ 9 Score -   How difficult have these problems made it for you to do your work, take care of your home and get along with others -       Alcohol & Drug Abuse Risk Screen    Do you average more than 2 drinks per night or 14 drinks a week: No    On any one occasion in the past three months have you have had more than 4 drinks containing alcohol:  No          Functional Ability and Level of Safety    Hearing: Hearing is good. Activities of Daily Living: The home contains: grab bars and rugs  Patient does total self care      Ambulation: with no difficulty     Fall Risk:  Fall Risk Assessment, last 12 mths 10/31/2022   Able to walk? Yes   Fall in past 12 months? 0   Do you feel unsteady?  0   Are you worried about falling 0      Abuse Screen:  Patient is not abused       Cognitive Screening    Has your family/caregiver stated any concerns about your memory: no     Cognitive Screening: Normal - MMSE (Mini Mental Status Exam)    Health Maintenance Due     Health Maintenance Due   Topic Date Due    Pneumococcal 0-64 years (1 - PCV) Never done    DTaP/Tdap/Td series (1 - Tdap) Never done    COVID-19 Vaccine (3 - Booster for Pfizer series) 11/03/2021    Flu Vaccine (1) 08/01/2022       Patient Care Team   Patient Care Team:  Nicole Roldan MD as PCP - General (Family Medicine)  Nicole Roldan MD as PCP - [facility] Provider    History   There is no problem list on file for this patient. Past Medical History:   Diagnosis Date    Anxiety     Anxiety     Depression     Hypercholesterolemia       No past surgical history on file. Current Outpatient Medications   Medication Sig Dispense Refill    meloxicam (MOBIC) 15 mg tablet       mirtazapine (REMERON PO) Take  by mouth. sertraline (ZOLOFT) 25 mg tablet TAKE 1 TABLET BY MOUTH DAILY 30 Tablet 0    busPIRone (BUSPAR) 5 mg tablet Take 5 mg by mouth three (3) times daily. cyclobenzaprine (FLEXERIL) 5 mg tablet  (Patient not taking: Reported on 1/23/2023)      azithromycin (Zithromax Z-Jax) 250 mg tablet 2 tabs by mouth day 1, then 1 tab by mouth daily on days 2-5 (Patient not taking: Reported on 1/23/2023) 6 Tablet 0    butalbital-acetaminophen-caffeine (FIORICET, ESGIC) -40 mg per tablet Take 1 Tablet by mouth every six (6) hours as needed for Headache. Indications: a migraine headache (Patient not taking: Reported on 1/23/2023) 30 Tablet 0    cloNIDine HCL (CATAPRES) 0.2 mg tablet TAKE 1 TABLET BY MOUTH EVERY NIGHT (Patient not taking: Reported on 1/23/2023) 30 Tablet 0    methylPREDNISolone (MEDROL DOSEPACK) 4 mg tablet As directed for 6 days one package (Patient not taking: No sig reported) 1 Dose Pack 0    hydrOXYzine HCL (ATARAX) 50 mg tablet Take 1 Tablet by mouth every eight (8) hours as needed for Anxiety.  (Patient not taking: No sig reported) 20 Tablet 0     No Known Allergies    Family History   Problem Relation Age of Onset    Hypertension Mother     No Known Problems Father      Social History     Tobacco Use    Smoking status: Every Day     Packs/day: 1.00     Types: Cigarettes    Smokeless tobacco: Never   Substance Use Topics    Alcohol use: Not Currently     Comment: 5 days per week, 2 jose david Gates MD

## 2023-01-23 NOTE — PROGRESS NOTES
Identified pt with two pt identifiers(name and ). Reviewed record in preparation for visit and have obtained necessary documentation. Chief Complaint   Patient presents with    Headache     X 2 weeks  follow up         Vitals:    23 0959   BP: 127/87   Pulse: 92   Resp: 21   Temp: 98 °F (36.7 °C)   TempSrc: Oral   SpO2: 98%   Weight: 224 lb (101.6 kg)   Height: 6' (1.829 m)       Health Maintenance Due   Topic    Pneumococcal 0-64 years (1 - PCV)    DTaP/Tdap/Td series (1 - Tdap)    COVID-19 Vaccine (3 - Booster for [employer] series)    Flu Vaccine (1)       Coordination of Care Questionnaire:  :   1) Have you been to an emergency room, urgent care, or hospitalized since your last visit? If yes, where when, and reason for visit? no       2. Have seen or consulted any other health care provider since your last visit? If yes, where when, and reason for visit? NO      Patient is accompanied by self I have received verbal consent from Henrique Majano to discuss any/all medical information while they are present in the room.

## 2023-01-23 NOTE — PATIENT INSTRUCTIONS
Medicare Wellness Visit, Male    The best way to live healthy is to have a lifestyle where you eat a well-balanced diet, exercise regularly, limit alcohol use, and quit all forms of tobacco/nicotine, if applicable. Regular preventive services are another way to keep healthy. Preventive services (vaccines, screening tests, monitoring & exams) can help personalize your care plan, which helps you manage your own care. Screening tests can find health problems at the earliest stages, when they are easiest to treat. [facility] follows the current, evidence-based guidelines published by the [facility] ([facility]) when recommending preventive services for our patients. Because we follow these guidelines, sometimes recommendations change over time as research supports it. (For example, a prostate screening blood test is no longer routinely recommended for men with no symptoms). Of course, you and your doctor may decide to screen more often for some diseases, based on your risk and co-morbidities (chronic disease you are already diagnosed with). Preventive services for you include:  - Medicare offers their members a free annual wellness visit, which is time for you and your primary care provider to discuss and plan for your preventive service needs.  Take advantage of this benefit every year!    -Over the age of 72 should receive the recommended pneumonia vaccines.   -All adults should have a flu vaccine yearly.  -All adults should receive a tetanus vaccine every 10 years.  -Over the age of 48 should receive the shingles vaccines.    -All adults should be screened once for Hepatitis C.  -All adults age 38-68 who are overweight should have a diabetes screening test once every three years.   -Other screening tests & preventive services for persons with diabetes include: an eye exam to screen for diabetic retinopathy, a kidney function test, a foot exam, and stricter control over your cholesterol.   -Cardiovascular screening for adults with routine risk involves an electrocardiogram (ECG) at intervals determined by the provider.     -Colorectal cancer screening should be done for adults age 43-69 with no increased risk factors for colorectal cancer. There are a number of acceptable methods of screening for this type of cancer. Each test has its own benefits and drawbacks. Discuss with your provider what is most appropriate for you during your annual wellness visit. The different tests include: colonoscopy (considered the best screening method), a fecal occult blood test, a fecal DNA test, and sigmoidoscopy.    -Lung cancer screening is recommended annually with a low dose CT scan for adults between age 54 and 68, who have smoked at least 30 pack years (equivalent of 1 pack per day for 30 days), and who is a current smoker or quit less than 15 years ago. -An Abdominal Aortic Aneurysm (AAA) Screening is recommended for men age 73-68 who has ever smoked in their lifetime.      Here is a list of your current Health Maintenance items (your personalized list of preventive services) with a due date:  Health Maintenance Due   Topic Date Due    Pneumococcal Vaccine (1 - PCV) Never done    DTaP/Tdap/Td  (1 - Tdap) Never done    COVID-19 Vaccine (3 - Booster for Pfizer series) 11/03/2021    Yearly Flu Vaccine (1) 08/01/2022

## 2023-01-25 LAB
ALBUMIN SERPL-MCNC: 4.2 G/DL (ref 4–5)
ALBUMIN/GLOB SERPL: 1.5 {RATIO} (ref 1.2–2.2)
ALP SERPL-CCNC: 111 IU/L (ref 44–121)
ALT SERPL-CCNC: 31 IU/L (ref 0–44)
APPEARANCE UR: CLEAR
AST SERPL-CCNC: 36 IU/L (ref 0–40)
BILIRUB SERPL-MCNC: <0.2 MG/DL (ref 0–1.2)
BILIRUB UR QL STRIP: NEGATIVE
BUN SERPL-MCNC: 16 MG/DL (ref 6–20)
BUN/CREAT SERPL: 13 (ref 9–20)
C TRACH RRNA SPEC QL NAA+PROBE: NEGATIVE
CALCIUM SERPL-MCNC: 9.1 MG/DL (ref 8.7–10.2)
CHLORIDE SERPL-SCNC: 105 MMOL/L (ref 96–106)
CHOLEST SERPL-MCNC: 245 MG/DL (ref 100–199)
CO2 SERPL-SCNC: 23 MMOL/L (ref 20–29)
COLOR UR: YELLOW
CREAT SERPL-MCNC: 1.23 MG/DL (ref 0.76–1.27)
EGFR: 78 ML/MIN/1.73
ERYTHROCYTE [DISTWIDTH] IN BLOOD BY AUTOMATED COUNT: 13.1 % (ref 11.6–15.4)
EST. AVERAGE GLUCOSE BLD GHB EST-MCNC: 123 MG/DL
FERRITIN SERPL-MCNC: 179 NG/ML (ref 30–400)
GLOBULIN SER CALC-MCNC: 2.8 G/DL (ref 1.5–4.5)
GLUCOSE SERPL-MCNC: 104 MG/DL (ref 70–99)
GLUCOSE UR QL STRIP: NEGATIVE
HBA1C MFR BLD: 5.9 % (ref 4.8–5.6)
HCT VFR BLD AUTO: 41.3 % (ref 37.5–51)
HDLC SERPL-MCNC: 69 MG/DL
HGB BLD-MCNC: 13.6 G/DL (ref 13–17.7)
HGB UR QL STRIP: NEGATIVE
HIV 1+2 AB+HIV1 P24 AG SERPL QL IA: NON REACTIVE
KETONES UR QL STRIP: NEGATIVE
LDLC SERPL CALC-MCNC: 162 MG/DL (ref 0–99)
LEUKOCYTE ESTERASE UR QL STRIP: NEGATIVE
MCH RBC QN AUTO: 28.5 PG (ref 26.6–33)
MCHC RBC AUTO-ENTMCNC: 32.9 G/DL (ref 31.5–35.7)
MCV RBC AUTO: 87 FL (ref 79–97)
MICRO URNS: NORMAL
N GONORRHOEA RRNA SPEC QL NAA+PROBE: NEGATIVE
NITRITE UR QL STRIP: NEGATIVE
PH UR STRIP: 5.5 [PH] (ref 5–7.5)
PLATELET # BLD AUTO: 339 X10E3/UL (ref 150–450)
POTASSIUM SERPL-SCNC: 4.6 MMOL/L (ref 3.5–5.2)
PROT SERPL-MCNC: 7 G/DL (ref 6–8.5)
PROT UR QL STRIP: NEGATIVE
RBC # BLD AUTO: 4.77 X10E6/UL (ref 4.14–5.8)
RPR SER QL: NON REACTIVE
SODIUM SERPL-SCNC: 140 MMOL/L (ref 134–144)
SP GR UR STRIP: 1.02 (ref 1–1.03)
T VAGINALIS RRNA SPEC QL NAA+PROBE: NEGATIVE
T4 FREE SERPL-MCNC: 1.33 NG/DL (ref 0.82–1.77)
TRIGL SERPL-MCNC: 84 MG/DL (ref 0–149)
TSH SERPL DL<=0.005 MIU/L-ACNC: 2.25 UIU/ML (ref 0.45–4.5)
UROBILINOGEN UR STRIP-MCNC: 0.2 MG/DL (ref 0.2–1)
VLDLC SERPL CALC-MCNC: 14 MG/DL (ref 5–40)
WBC # BLD AUTO: 7.8 X10E3/UL (ref 3.4–10.8)

## 2023-05-08 NOTE — TELEPHONE ENCOUNTER
Last appointment: 1/23/23 with MD Walter Goldman  Next appointment: none  Previous refill encounter(s): 4/10/23 #30    Requested Prescriptions     Pending Prescriptions Disp Refills    cloNIDine (CATAPRES) 0.2 MG tablet [Pharmacy Med Name: CLONIDINE 0.2MG TABLETS] 30 tablet 0     Sig: TAKE 1 TABLET BY MOUTH EVERY NIGHT    sertraline (ZOLOFT) 25 MG tablet [Pharmacy Med Name: SERTRALINE 25MG TABLETS] 30 tablet 0     Sig: TAKE 1 TABLET BY MOUTH DAILY

## 2023-05-09 RX ORDER — CLONIDINE HYDROCHLORIDE 0.2 MG/1
TABLET ORAL
Qty: 30 TABLET | Refills: 0 | Status: SHIPPED | OUTPATIENT
Start: 2023-05-09

## 2023-05-09 RX ORDER — SERTRALINE HYDROCHLORIDE 25 MG/1
TABLET, FILM COATED ORAL
Qty: 30 TABLET | Refills: 0 | Status: SHIPPED | OUTPATIENT
Start: 2023-05-09

## 2023-05-24 RX ORDER — BUTALBITAL, ACETAMINOPHEN AND CAFFEINE 50; 325; 40 MG/1; MG/1; MG/1
1 TABLET ORAL EVERY 6 HOURS PRN
COMMUNITY
Start: 2023-01-13

## 2023-05-24 RX ORDER — AZITHROMYCIN 250 MG/1
TABLET, FILM COATED ORAL
COMMUNITY
Start: 2023-01-13 | End: 2023-07-02 | Stop reason: ALTCHOICE

## 2023-05-24 RX ORDER — CYCLOBENZAPRINE HCL 5 MG
TABLET ORAL
COMMUNITY
Start: 2022-11-10

## 2023-05-24 RX ORDER — BUSPIRONE HYDROCHLORIDE 5 MG/1
5 TABLET ORAL 3 TIMES DAILY
COMMUNITY
Start: 2022-07-26

## 2023-05-24 RX ORDER — METHYLPREDNISOLONE 4 MG/1
TABLET ORAL
COMMUNITY
Start: 2022-10-31 | End: 2023-07-02

## 2023-05-24 RX ORDER — MOMETASONE FUROATE 50 UG/1
2 SPRAY, METERED NASAL DAILY
COMMUNITY
Start: 2023-01-23

## 2023-05-24 RX ORDER — MELOXICAM 15 MG/1
TABLET ORAL
COMMUNITY
Start: 2022-11-10

## 2023-05-24 RX ORDER — HYDROXYZINE 50 MG/1
50 TABLET, FILM COATED ORAL EVERY 8 HOURS PRN
COMMUNITY
Start: 2022-08-19 | End: 2023-07-02 | Stop reason: SDUPTHER

## 2023-06-05 RX ORDER — CLONIDINE HYDROCHLORIDE 0.2 MG/1
TABLET ORAL
Qty: 30 TABLET | Refills: 0 | Status: SHIPPED | OUTPATIENT
Start: 2023-06-05

## 2023-07-02 ENCOUNTER — HOSPITAL ENCOUNTER (EMERGENCY)
Facility: HOSPITAL | Age: 38
Discharge: HOME | End: 2023-07-02
Attending: EMERGENCY MEDICINE

## 2023-07-02 VITALS
BODY MASS INDEX: 28.05 KG/M2 | DIASTOLIC BLOOD PRESSURE: 92 MMHG | WEIGHT: 211.64 LBS | HEIGHT: 73 IN | OXYGEN SATURATION: 96 % | SYSTOLIC BLOOD PRESSURE: 132 MMHG | HEART RATE: 89 BPM | RESPIRATION RATE: 16 BRPM | TEMPERATURE: 97 F

## 2023-07-02 DIAGNOSIS — F41.1 ANXIETY STATE: Primary | ICD-10-CM

## 2023-07-02 LAB
ALBUMIN SERPL-MCNC: 3.8 G/DL (ref 3.5–5)
ALBUMIN/GLOB SERPL: 1.1 (ref 1.1–2.2)
ALP SERPL-CCNC: 107 U/L (ref 45–117)
ALT SERPL-CCNC: 33 U/L (ref 12–78)
ANION GAP SERPL CALC-SCNC: 10 MMOL/L (ref 5–15)
AST SERPL-CCNC: 21 U/L (ref 15–37)
BASOPHILS # BLD: 0 K/UL (ref 0–0.1)
BASOPHILS NFR BLD: 1 % (ref 0–1)
BILIRUB SERPL-MCNC: 0.5 MG/DL (ref 0.2–1)
BUN SERPL-MCNC: 9 MG/DL (ref 6–20)
BUN/CREAT SERPL: 8 (ref 12–20)
CALCIUM SERPL-MCNC: 8.9 MG/DL (ref 8.5–10.1)
CHLORIDE SERPL-SCNC: 102 MMOL/L (ref 97–108)
CO2 SERPL-SCNC: 26 MMOL/L (ref 21–32)
CREAT SERPL-MCNC: 1.1 MG/DL (ref 0.7–1.3)
DIFFERENTIAL METHOD BLD: ABNORMAL
EOSINOPHIL # BLD: 0.2 K/UL (ref 0–0.4)
EOSINOPHIL NFR BLD: 3 % (ref 0–7)
ERYTHROCYTE [DISTWIDTH] IN BLOOD BY AUTOMATED COUNT: 13.3 % (ref 11.5–14.5)
ETHANOL SERPL-MCNC: <10 MG/DL (ref 0–0.08)
GLOBULIN SER CALC-MCNC: 3.5 G/DL (ref 2–4)
GLUCOSE SERPL-MCNC: 110 MG/DL (ref 65–100)
HCT VFR BLD AUTO: 41.1 % (ref 36.6–50.3)
HGB BLD-MCNC: 13.7 G/DL (ref 12.1–17)
IMM GRANULOCYTES # BLD AUTO: 0 K/UL (ref 0–0.04)
IMM GRANULOCYTES NFR BLD AUTO: 0 % (ref 0–0.5)
LYMPHOCYTES # BLD: 1.9 K/UL (ref 0.8–3.5)
LYMPHOCYTES NFR BLD: 32 % (ref 12–49)
MCH RBC QN AUTO: 28.8 PG (ref 26–34)
MCHC RBC AUTO-ENTMCNC: 33.3 G/DL (ref 30–36.5)
MCV RBC AUTO: 86.5 FL (ref 80–99)
MONOCYTES # BLD: 0.5 K/UL (ref 0–1)
MONOCYTES NFR BLD: 8 % (ref 5–13)
NEUTS SEG # BLD: 3.3 K/UL (ref 1.8–8)
NEUTS SEG NFR BLD: 56 % (ref 32–75)
NRBC # BLD: 0 K/UL (ref 0–0.01)
NRBC BLD-RTO: 0 PER 100 WBC
PLATELET # BLD AUTO: 330 K/UL (ref 150–400)
PMV BLD AUTO: 8.5 FL (ref 8.9–12.9)
POTASSIUM SERPL-SCNC: 3.9 MMOL/L (ref 3.5–5.1)
PROT SERPL-MCNC: 7.3 G/DL (ref 6.4–8.2)
RBC # BLD AUTO: 4.75 M/UL (ref 4.1–5.7)
SODIUM SERPL-SCNC: 138 MMOL/L (ref 136–145)
TROPONIN I SERPL HS-MCNC: 6 NG/L (ref 0–76)
WBC # BLD AUTO: 5.8 K/UL (ref 4.1–11.1)

## 2023-07-02 PROCEDURE — 2580000003 HC RX 258: Performed by: NURSE PRACTITIONER

## 2023-07-02 PROCEDURE — 84484 ASSAY OF TROPONIN QUANT: CPT

## 2023-07-02 PROCEDURE — 93005 ELECTROCARDIOGRAM TRACING: CPT | Performed by: NURSE PRACTITIONER

## 2023-07-02 PROCEDURE — 96360 HYDRATION IV INFUSION INIT: CPT

## 2023-07-02 PROCEDURE — 82077 ASSAY SPEC XCP UR&BREATH IA: CPT

## 2023-07-02 PROCEDURE — 85025 COMPLETE CBC W/AUTO DIFF WBC: CPT

## 2023-07-02 PROCEDURE — 80053 COMPREHEN METABOLIC PANEL: CPT

## 2023-07-02 PROCEDURE — 99284 EMERGENCY DEPT VISIT MOD MDM: CPT

## 2023-07-02 PROCEDURE — 36415 COLL VENOUS BLD VENIPUNCTURE: CPT

## 2023-07-02 RX ORDER — HYDROXYZINE 50 MG/1
50 TABLET, FILM COATED ORAL EVERY 8 HOURS PRN
Qty: 12 TABLET | Refills: 0 | Status: SHIPPED | OUTPATIENT
Start: 2023-07-02

## 2023-07-02 RX ORDER — 0.9 % SODIUM CHLORIDE 0.9 %
1000 INTRAVENOUS SOLUTION INTRAVENOUS ONCE
Status: COMPLETED | OUTPATIENT
Start: 2023-07-02 | End: 2023-07-02

## 2023-07-02 RX ADMIN — SODIUM CHLORIDE 1000 ML: 9 INJECTION, SOLUTION INTRAVENOUS at 14:12

## 2023-07-02 ASSESSMENT — PAIN SCALES - GENERAL: PAINLEVEL_OUTOF10: 0

## 2023-07-02 ASSESSMENT — PAIN - FUNCTIONAL ASSESSMENT: PAIN_FUNCTIONAL_ASSESSMENT: NONE - DENIES PAIN

## 2023-07-03 LAB
EKG ATRIAL RATE: 99 BPM
EKG DIAGNOSIS: NORMAL
EKG P AXIS: 9 DEGREES
EKG P-R INTERVAL: 144 MS
EKG Q-T INTERVAL: 328 MS
EKG QRS DURATION: 80 MS
EKG QTC CALCULATION (BAZETT): 420 MS
EKG R AXIS: 27 DEGREES
EKG T AXIS: 14 DEGREES
EKG VENTRICULAR RATE: 99 BPM

## 2023-07-03 PROCEDURE — 93010 ELECTROCARDIOGRAM REPORT: CPT | Performed by: SPECIALIST

## 2023-09-29 ENCOUNTER — APPOINTMENT (OUTPATIENT)
Facility: HOSPITAL | Age: 38
End: 2023-09-29
Payer: MEDICAID

## 2023-09-29 ENCOUNTER — HOSPITAL ENCOUNTER (EMERGENCY)
Facility: HOSPITAL | Age: 38
Discharge: HOME | End: 2023-09-29
Payer: MEDICAID

## 2023-09-29 VITALS
SYSTOLIC BLOOD PRESSURE: 123 MMHG | TEMPERATURE: 98.3 F | RESPIRATION RATE: 18 BRPM | OXYGEN SATURATION: 97 % | BODY MASS INDEX: 29.12 KG/M2 | HEART RATE: 77 BPM | HEIGHT: 72 IN | WEIGHT: 215 LBS | DIASTOLIC BLOOD PRESSURE: 91 MMHG

## 2023-09-29 DIAGNOSIS — Z20.822 PERSON UNDER INVESTIGATION FOR COVID-19: ICD-10-CM

## 2023-09-29 DIAGNOSIS — F41.1 ANXIETY STATE: Primary | ICD-10-CM

## 2023-09-29 LAB
ALBUMIN SERPL-MCNC: 3.5 G/DL (ref 3.5–5)
ALBUMIN/GLOB SERPL: 1 (ref 1.1–2.2)
ALP SERPL-CCNC: 106 U/L (ref 45–117)
ALT SERPL-CCNC: 27 U/L (ref 12–78)
ANION GAP SERPL CALC-SCNC: 5 MMOL/L (ref 5–15)
AST SERPL-CCNC: 20 U/L (ref 15–37)
BASOPHILS # BLD: 0 K/UL (ref 0–0.1)
BASOPHILS NFR BLD: 1 % (ref 0–1)
BILIRUB SERPL-MCNC: 0.4 MG/DL (ref 0.2–1)
BUN SERPL-MCNC: 11 MG/DL (ref 6–20)
BUN/CREAT SERPL: 10 (ref 12–20)
CALCIUM SERPL-MCNC: 9.1 MG/DL (ref 8.5–10.1)
CHLORIDE SERPL-SCNC: 102 MMOL/L (ref 97–108)
CO2 SERPL-SCNC: 31 MMOL/L (ref 21–32)
CREAT SERPL-MCNC: 1.15 MG/DL (ref 0.7–1.3)
DIFFERENTIAL METHOD BLD: ABNORMAL
EOSINOPHIL # BLD: 0.3 K/UL (ref 0–0.4)
EOSINOPHIL NFR BLD: 5 % (ref 0–7)
ERYTHROCYTE [DISTWIDTH] IN BLOOD BY AUTOMATED COUNT: 13.5 % (ref 11.5–14.5)
GLOBULIN SER CALC-MCNC: 3.6 G/DL (ref 2–4)
GLUCOSE SERPL-MCNC: 111 MG/DL (ref 65–100)
HCT VFR BLD AUTO: 42.1 % (ref 36.6–50.3)
HGB BLD-MCNC: 14.2 G/DL (ref 12.1–17)
IMM GRANULOCYTES # BLD AUTO: 0 K/UL (ref 0–0.04)
IMM GRANULOCYTES NFR BLD AUTO: 0 % (ref 0–0.5)
LYMPHOCYTES # BLD: 1.8 K/UL (ref 0.8–3.5)
LYMPHOCYTES NFR BLD: 30 % (ref 12–49)
MCH RBC QN AUTO: 29.1 PG (ref 26–34)
MCHC RBC AUTO-ENTMCNC: 33.7 G/DL (ref 30–36.5)
MCV RBC AUTO: 86.3 FL (ref 80–99)
MONOCYTES # BLD: 0.5 K/UL (ref 0–1)
MONOCYTES NFR BLD: 9 % (ref 5–13)
NEUTS SEG # BLD: 3.3 K/UL (ref 1.8–8)
NEUTS SEG NFR BLD: 55 % (ref 32–75)
NRBC # BLD: 0 K/UL (ref 0–0.01)
NRBC BLD-RTO: 0 PER 100 WBC
PLATELET # BLD AUTO: 313 K/UL (ref 150–400)
PMV BLD AUTO: 8.5 FL (ref 8.9–12.9)
POTASSIUM SERPL-SCNC: 4.3 MMOL/L (ref 3.5–5.1)
PROT SERPL-MCNC: 7.1 G/DL (ref 6.4–8.2)
RBC # BLD AUTO: 4.88 M/UL (ref 4.1–5.7)
SODIUM SERPL-SCNC: 138 MMOL/L (ref 136–145)
WBC # BLD AUTO: 5.9 K/UL (ref 4.1–11.1)

## 2023-09-29 PROCEDURE — 80053 COMPREHEN METABOLIC PANEL: CPT

## 2023-09-29 PROCEDURE — 36415 COLL VENOUS BLD VENIPUNCTURE: CPT

## 2023-09-29 PROCEDURE — 71045 X-RAY EXAM CHEST 1 VIEW: CPT

## 2023-09-29 PROCEDURE — 85025 COMPLETE CBC W/AUTO DIFF WBC: CPT

## 2023-09-29 PROCEDURE — 87635 SARS-COV-2 COVID-19 AMP PRB: CPT

## 2023-09-29 PROCEDURE — 99284 EMERGENCY DEPT VISIT MOD MDM: CPT

## 2023-09-29 RX ORDER — FLUTICASONE PROPIONATE 50 MCG
1 SPRAY, SUSPENSION (ML) NASAL DAILY
Qty: 16 G | Refills: 0 | Status: SHIPPED | OUTPATIENT
Start: 2023-09-29

## 2023-09-29 ASSESSMENT — PAIN DESCRIPTION - DESCRIPTORS
DESCRIPTORS: ACHING
DESCRIPTORS: SORE;DULL

## 2023-09-29 ASSESSMENT — PAIN SCALES - GENERAL
PAINLEVEL_OUTOF10: 4
PAINLEVEL_OUTOF10: 4

## 2023-09-29 ASSESSMENT — PAIN - FUNCTIONAL ASSESSMENT
PAIN_FUNCTIONAL_ASSESSMENT: 0-10
PAIN_FUNCTIONAL_ASSESSMENT: 0-10

## 2023-09-29 ASSESSMENT — PAIN DESCRIPTION - ORIENTATION: ORIENTATION: RIGHT;LEFT

## 2023-09-29 ASSESSMENT — PAIN DESCRIPTION - LOCATION
LOCATION: ABDOMEN
LOCATION: GENERALIZED

## 2023-09-29 NOTE — DISCHARGE INSTRUCTIONS
It was a pleasure taking care of you at [facility] Emergency Department today. We know that when you come to [facility], you are entrusting us with your health, comfort, and safety. Our physicians and nurses honor that trust, and we truly appreciate the opportunity to care for you and your loved ones. We also value our feedback. If you receive a survey about your Emergency Department experience today, please fill it out. We care about our patients' feedback, and we listen to what you have to say. Thank you!

## 2023-09-29 NOTE — ED NOTES
Patient (s) given copy of dc instructions and 1 script(s). Patient (s)  verbalized understanding of instructions and script (s). Patient given a current medication reconciliation form and verbalized understanding of their medications. Patient (s) verbalized understanding of the importance of discussing medications with his or her physician or clinic they will be following up with. Patient alert and oriented and in no acute distress. Patient discharged home, patient offered wheelchair, patient declines wheelchair.          Hemal Moreau RN  09/29/23 7232

## 2023-09-29 NOTE — ED TRIAGE NOTES
Pt reports SOB and intermittent generalized body aches worsening x 2 weeks.  Pt reports hx of anxiety, reports SOB wakes him up at night and he cant get back to sleep

## 2023-09-29 NOTE — ED NOTES
Patient came into ED by self, c/o SOB and generalized body ache, especially in the upper shoulder and back region. Patient states that he [place] the [place] and had SOB which affected his walking. Patient states he often gets SOB after anxiety attack two times a week, but states this time it is different because he is having shortness of breath continuously along with fatigue. Patient states he has no fever, but his nose is congested. Patient also states he wants to make sure that his heart status is well.             Rashad Singh  09/29/23 2486

## 2023-10-01 LAB
SARS-COV-2 RNA RESP QL NAA+PROBE: NOT DETECTED
SOURCE: NORMAL

## 2023-10-26 ENCOUNTER — HOSPITAL ENCOUNTER (EMERGENCY)
Facility: HOSPITAL | Age: 38
Discharge: HOME | End: 2023-10-26
Payer: MEDICAID

## 2023-10-26 VITALS
TEMPERATURE: 98.5 F | DIASTOLIC BLOOD PRESSURE: 84 MMHG | HEIGHT: 72 IN | HEART RATE: 80 BPM | RESPIRATION RATE: 20 BRPM | WEIGHT: 215 LBS | SYSTOLIC BLOOD PRESSURE: 119 MMHG | BODY MASS INDEX: 29.12 KG/M2 | OXYGEN SATURATION: 94 %

## 2023-10-26 DIAGNOSIS — T43.8X5A ADVERSE EFFECT OF OTHER PSYCHOTROPIC DRUGS, INITIAL ENCOUNTER: Primary | ICD-10-CM

## 2023-10-26 DIAGNOSIS — R11.2 NAUSEA AND VOMITING, UNSPECIFIED VOMITING TYPE: ICD-10-CM

## 2023-10-26 LAB
ANION GAP BLD CALC-SCNC: 8 (ref 10–20)
CA-I BLD-MCNC: 1.24 MMOL/L (ref 1.12–1.32)
CHLORIDE BLD-SCNC: 105 MMOL/L (ref 100–108)
CO2 BLD-SCNC: 30 MMOL/L (ref 19–24)
CREAT UR-MCNC: 1 MG/DL (ref 0.6–1.3)
GLUCOSE BLD STRIP.AUTO-MCNC: 111 MG/DL (ref 74–106)
LACTATE BLD-SCNC: 0.92 MMOL/L (ref 0.4–2)
POTASSIUM BLD-SCNC: 4.3 MMOL/L (ref 3.5–5.5)
SERVICE CMNT-IMP: ABNORMAL
SODIUM BLD-SCNC: 143 MMOL/L (ref 136–145)
SPECIMEN SITE: ABNORMAL

## 2023-10-26 PROCEDURE — 80047 BASIC METABLC PNL IONIZED CA: CPT

## 2023-10-26 PROCEDURE — 99284 EMERGENCY DEPT VISIT MOD MDM: CPT

## 2023-10-26 PROCEDURE — 2580000003 HC RX 258: Performed by: PHYSICIAN ASSISTANT

## 2023-10-26 PROCEDURE — 83605 ASSAY OF LACTIC ACID: CPT

## 2023-10-26 PROCEDURE — 6360000002 HC RX W HCPCS: Performed by: PHYSICIAN ASSISTANT

## 2023-10-26 PROCEDURE — 96374 THER/PROPH/DIAG INJ IV PUSH: CPT

## 2023-10-26 PROCEDURE — 96361 HYDRATE IV INFUSION ADD-ON: CPT

## 2023-10-26 RX ORDER — ONDANSETRON 2 MG/ML
4 INJECTION INTRAMUSCULAR; INTRAVENOUS ONCE
Status: COMPLETED | OUTPATIENT
Start: 2023-10-26 | End: 2023-10-26

## 2023-10-26 RX ORDER — ONDANSETRON 4 MG/1
4 TABLET, ORALLY DISINTEGRATING ORAL EVERY 8 HOURS PRN
Qty: 10 TABLET | Refills: 0 | Status: SHIPPED | OUTPATIENT
Start: 2023-10-26

## 2023-10-26 RX ORDER — 0.9 % SODIUM CHLORIDE 0.9 %
1000 INTRAVENOUS SOLUTION INTRAVENOUS ONCE
Status: COMPLETED | OUTPATIENT
Start: 2023-10-26 | End: 2023-10-26

## 2023-10-26 RX ADMIN — SODIUM CHLORIDE 1000 ML: 9 INJECTION, SOLUTION INTRAVENOUS at 13:45

## 2023-10-26 RX ADMIN — ONDANSETRON 4 MG: 2 INJECTION INTRAMUSCULAR; INTRAVENOUS at 13:45

## 2023-10-26 ASSESSMENT — PAIN DESCRIPTION - DESCRIPTORS: DESCRIPTORS: ACHING;SORE

## 2023-10-26 ASSESSMENT — PAIN DESCRIPTION - LOCATION: LOCATION: NECK

## 2023-10-26 ASSESSMENT — PAIN SCALES - GENERAL: PAINLEVEL_OUTOF10: 4

## 2023-10-26 ASSESSMENT — PAIN - FUNCTIONAL ASSESSMENT: PAIN_FUNCTIONAL_ASSESSMENT: 0-10

## 2023-10-26 NOTE — ED PROVIDER NOTES
these medications      busPIRone 5 MG tablet  Commonly known as: BUSPAR     butalbital-acetaminophen-caffeine -40 MG per tablet  Commonly known as: FIORICET, ESGIC     cloNIDine 0.2 MG tablet  Commonly known as: CATAPRES  TAKE 1 TABLET BY MOUTH EVERY NIGHT     cyclobenzaprine 5 MG tablet  Commonly known as: FLEXERIL     fluticasone 50 MCG/ACT nasal spray  Commonly known as: Flonase Allergy Relief  1 spray by Each Nostril route daily     hydrOXYzine HCl 50 MG tablet  Commonly known as: ATARAX  Take 1 tablet by mouth every 8 hours as needed for Anxiety     meloxicam 15 MG tablet  Commonly known as: MOBIC     mometasone 50 MCG/ACT nasal spray  Commonly known as: NASONEX     sertraline 25 MG tablet  Commonly known as: ZOLOFT  TAKE 1 TABLET BY MOUTH DAILY               Where to Get Your Medications        These medications were sent to [address] [place] [address], [address] [facility] [phone] - F [phone]  [id number] [place] [id number]      Phone: [phone]   ondansetron 4 MG disintegrating tablet           DISCONTINUED MEDICATIONS:  Current Discharge Medication List          I have discussed the history and physical, results, MDM, and treatment plan with my supervising physician, Dr Josie Tovar, who agrees with my plan. I am the Primary Clinician of Record. Kayla Vasquez PA-C (electronically signed)    (Please note that parts of this dictation were completed with voice recognition software. Quite often unanticipated grammatical, syntax, homophones, and other interpretive errors are inadvertently transcribed by the computer software. Please disregards these errors.  Please excuse any errors that have escaped final proofreading.)     Kayla Vasquez PA-C  10/26/23 6452

## 2023-10-26 NOTE — ED TRIAGE NOTES
Pt complaining of N/V, fatigue, body aches and insomnia for the past 4-5 days.  Pt reports he recently started taking buspar and an SSRI and thinks it could be a bad reaction

## 2023-10-26 NOTE — ED NOTES
Patient (s)  given copy of dc instructions and 1 script(s). Patient (s)  verbalized understanding of instructions and script (s). Patient given a current medication reconciliation form and verbalized understanding of their medications. Patient (s) verbalized understanding of the importance of discussing medications with his or her physician or clinic they will be following up with. Patient alert and oriented and in no acute distress. Patient discharged home ambulatory with self.       Gideon Vergara RN  10/26/23 [id number]

## 2023-11-14 ENCOUNTER — HOSPITAL ENCOUNTER (EMERGENCY)
Facility: HOSPITAL | Age: 38
Discharge: HOME | End: 2023-11-14
Attending: EMERGENCY MEDICINE
Payer: MEDICAID

## 2023-11-14 VITALS
BODY MASS INDEX: 29.8 KG/M2 | SYSTOLIC BLOOD PRESSURE: 138 MMHG | WEIGHT: 220 LBS | RESPIRATION RATE: 18 BRPM | DIASTOLIC BLOOD PRESSURE: 91 MMHG | HEIGHT: 72 IN | TEMPERATURE: 98.7 F | OXYGEN SATURATION: 98 % | HEART RATE: 88 BPM

## 2023-11-14 DIAGNOSIS — E86.0 DEHYDRATION: ICD-10-CM

## 2023-11-14 DIAGNOSIS — R00.2 PALPITATIONS: Primary | ICD-10-CM

## 2023-11-14 LAB
ALBUMIN SERPL-MCNC: 3.5 G/DL (ref 3.5–5)
ALBUMIN/GLOB SERPL: 0.9 (ref 1.1–2.2)
ALP SERPL-CCNC: 124 U/L (ref 45–117)
ALT SERPL-CCNC: 32 U/L (ref 12–78)
ANION GAP SERPL CALC-SCNC: 8 MMOL/L (ref 5–15)
AST SERPL-CCNC: 21 U/L (ref 15–37)
BASOPHILS # BLD: 0 K/UL (ref 0–0.1)
BASOPHILS NFR BLD: 1 % (ref 0–1)
BILIRUB SERPL-MCNC: 0.3 MG/DL (ref 0.2–1)
BUN SERPL-MCNC: 11 MG/DL (ref 6–20)
BUN/CREAT SERPL: 11 (ref 12–20)
CALCIUM SERPL-MCNC: 8.9 MG/DL (ref 8.5–10.1)
CHLORIDE SERPL-SCNC: 103 MMOL/L (ref 97–108)
CO2 SERPL-SCNC: 28 MMOL/L (ref 21–32)
CREAT SERPL-MCNC: 1.03 MG/DL (ref 0.7–1.3)
DIFFERENTIAL METHOD BLD: ABNORMAL
EOSINOPHIL # BLD: 0.2 K/UL (ref 0–0.4)
EOSINOPHIL NFR BLD: 3 % (ref 0–7)
ERYTHROCYTE [DISTWIDTH] IN BLOOD BY AUTOMATED COUNT: 13.2 % (ref 11.5–14.5)
GLOBULIN SER CALC-MCNC: 3.7 G/DL (ref 2–4)
GLUCOSE SERPL-MCNC: 158 MG/DL (ref 65–100)
HCT VFR BLD AUTO: 39.9 % (ref 36.6–50.3)
HGB BLD-MCNC: 13.1 G/DL (ref 12.1–17)
IMM GRANULOCYTES # BLD AUTO: 0 K/UL (ref 0–0.04)
IMM GRANULOCYTES NFR BLD AUTO: 0 % (ref 0–0.5)
LYMPHOCYTES # BLD: 1.6 K/UL (ref 0.8–3.5)
LYMPHOCYTES NFR BLD: 26 % (ref 12–49)
MCH RBC QN AUTO: 28.7 PG (ref 26–34)
MCHC RBC AUTO-ENTMCNC: 32.8 G/DL (ref 30–36.5)
MCV RBC AUTO: 87.3 FL (ref 80–99)
MONOCYTES # BLD: 0.5 K/UL (ref 0–1)
MONOCYTES NFR BLD: 8 % (ref 5–13)
NEUTS SEG # BLD: 3.8 K/UL (ref 1.8–8)
NEUTS SEG NFR BLD: 62 % (ref 32–75)
NRBC # BLD: 0 K/UL (ref 0–0.01)
NRBC BLD-RTO: 0 PER 100 WBC
PLATELET # BLD AUTO: 333 K/UL (ref 150–400)
PMV BLD AUTO: 8.6 FL (ref 8.9–12.9)
POTASSIUM SERPL-SCNC: 4.1 MMOL/L (ref 3.5–5.1)
PROT SERPL-MCNC: 7.2 G/DL (ref 6.4–8.2)
RBC # BLD AUTO: 4.57 M/UL (ref 4.1–5.7)
SODIUM SERPL-SCNC: 139 MMOL/L (ref 136–145)
WBC # BLD AUTO: 6.1 K/UL (ref 4.1–11.1)

## 2023-11-14 PROCEDURE — 96360 HYDRATION IV INFUSION INIT: CPT

## 2023-11-14 PROCEDURE — 36415 COLL VENOUS BLD VENIPUNCTURE: CPT

## 2023-11-14 PROCEDURE — 85025 COMPLETE CBC W/AUTO DIFF WBC: CPT

## 2023-11-14 PROCEDURE — 80053 COMPREHEN METABOLIC PANEL: CPT

## 2023-11-14 PROCEDURE — 99284 EMERGENCY DEPT VISIT MOD MDM: CPT

## 2023-11-14 PROCEDURE — 2580000003 HC RX 258: Performed by: EMERGENCY MEDICINE

## 2023-11-14 RX ORDER — 0.9 % SODIUM CHLORIDE 0.9 %
1000 INTRAVENOUS SOLUTION INTRAVENOUS ONCE
Status: COMPLETED | OUTPATIENT
Start: 2023-11-14 | End: 2023-11-14

## 2023-11-14 RX ORDER — ONDANSETRON 4 MG/1
4 TABLET, ORALLY DISINTEGRATING ORAL EVERY 8 HOURS PRN
Qty: 12 TABLET | Refills: 0 | Status: SHIPPED | OUTPATIENT
Start: 2023-11-14

## 2023-11-14 RX ORDER — ONDANSETRON 2 MG/ML
4 INJECTION INTRAMUSCULAR; INTRAVENOUS ONCE
Status: DISCONTINUED | OUTPATIENT
Start: 2023-11-14 | End: 2023-11-14 | Stop reason: HOSPADM

## 2023-11-14 RX ADMIN — SODIUM CHLORIDE 1000 ML: 9 INJECTION, SOLUTION INTRAVENOUS at 15:21

## 2023-11-14 ASSESSMENT — PAIN - FUNCTIONAL ASSESSMENT: PAIN_FUNCTIONAL_ASSESSMENT: NONE - DENIES PAIN

## 2023-11-14 NOTE — ED NOTES
Patient (s)  given copy of dc instructions and 1 script(s). Patient (s)  verbalized understanding of instructions and script (s). Patient given a current medication reconciliation form and verbalized understanding of their medications. Patient (s) verbalized understanding of the importance of discussing medications with his or her physician or clinic they will be following up with. Patient alert and oriented and in no acute distress. Patient discharged home ambulatory with self.       Armin Lombardo RN  11/14/23 6200

## 2023-11-15 LAB
EKG ATRIAL RATE: 90 BPM
EKG DIAGNOSIS: NORMAL
EKG P AXIS: 3 DEGREES
EKG P-R INTERVAL: 154 MS
EKG Q-T INTERVAL: 354 MS
EKG QRS DURATION: 84 MS
EKG QTC CALCULATION (BAZETT): 433 MS
EKG R AXIS: 45 DEGREES
EKG T AXIS: 10 DEGREES
EKG VENTRICULAR RATE: 90 BPM

## 2024-02-08 ENCOUNTER — OFFICE VISIT (OUTPATIENT)
Age: 39
End: 2024-02-08

## 2024-02-08 VITALS
OXYGEN SATURATION: 97 % | BODY MASS INDEX: 29.42 KG/M2 | HEART RATE: 99 BPM | RESPIRATION RATE: 18 BRPM | TEMPERATURE: 99.4 F | WEIGHT: 217.2 LBS | HEIGHT: 72 IN | DIASTOLIC BLOOD PRESSURE: 90 MMHG | SYSTOLIC BLOOD PRESSURE: 138 MMHG

## 2024-02-08 DIAGNOSIS — R30.0 DYSURIA: Primary | ICD-10-CM

## 2024-02-08 DIAGNOSIS — B37.0 ORAL CANDIDIASIS: ICD-10-CM

## 2024-02-08 LAB
BILIRUBIN, URINE, POC: NEGATIVE
BLOOD URINE, POC: ABNORMAL
GLUCOSE URINE, POC: NEGATIVE
KETONES, URINE, POC: NEGATIVE
LEUKOCYTE ESTERASE, URINE, POC: ABNORMAL
NITRITE, URINE, POC: NEGATIVE
PH, URINE, POC: 6 (ref 4.6–8)
PROTEIN,URINE, POC: ABNORMAL
SPECIFIC GRAVITY, URINE, POC: 1.02 (ref 1–1.03)
URINALYSIS CLARITY, POC: ABNORMAL
URINALYSIS COLOR, POC: ABNORMAL
UROBILINOGEN, POC: ABNORMAL

## 2024-02-08 RX ORDER — ESCITALOPRAM OXALATE 10 MG/1
10 TABLET ORAL DAILY
COMMUNITY

## 2024-02-08 RX ORDER — DOXYCYCLINE 100 MG/1
100 CAPSULE ORAL 2 TIMES DAILY
Qty: 14 CAPSULE | Refills: 0 | Status: SHIPPED | OUTPATIENT
Start: 2024-02-08 | End: 2024-02-15

## 2024-02-08 NOTE — PROGRESS NOTES
John Fonseca (:  1985) is a 38 y.o. male,New patient, here for evaluation of the following chief complaint(s):  Urinary Tract Infection (Patient is having burning while voiding. Onset of symptoms 1 week ago. Patient also states back of tongue and throat is irritated as well.)      ASSESSMENT/PLAN:  Visit Diagnoses and Associated Orders       Dysuria    -  Primary    AMB POC URINALYSIS DIP STICK MANUAL W/O MICRO [42882 CPT(R)]      doxycycline monohydrate (MONODOX) 100 MG capsule [9900]      Chlamydia, Gonorrhea, Trichomoniasis [[id number] Custom]   - Future Order    Culture, Urine [[id number] Custom]   - Future Order    Culture, Urine [[id number] Custom]      Chlamydia, Gonorrhea, Trichomoniasis [[id number] Custom]           Oral candidiasis        nystatin (MYCOSTATIN) 518502 UNIT/ML suspension [5751]           ORDERS WITHOUT AN ASSOCIATED DIAGNOSIS    escitalopram (LEXAPRO) 10 MG tablet [20425]                 Per orders, start on doxy and will treat results accordingly when available   Encouraged pt to have labs (hiv, rpr, etc) with PCP or health dept   Follow up with PCP in PRN days if symptoms persist or if symptoms worsen.    SUBJECTIVE/OBJECTIVE:    Urinary Tract Infection  HPI:   38 y.o. male presents with symptoms of   Urinary Tract Infection  Patient complains of dysuria, lower abdominal discomfort.   He has had symptoms for 1 week. Patient also complains of  mouth irritation, increased sensitivity, dysgeusia, blood in mouth after teeth brushing.  He believes he has thrush . Patient denies  urethral discharge,  . Patient does not have a history of recurrent UTI.  Patient does not have a history of pyelonephritis.   He is sexually active with females.  Female partner had STI testing one week ago and was negative for everything.         Vitals:    24 1532   BP: (!) 138/90   Site: Left Upper Arm   Position: Sitting   Cuff Size: Large Adult   Pulse: 99   Resp: 18   Temp: 99.4 °F (37.4 °C)   TempSrc: Oral

## 2024-02-12 DIAGNOSIS — R30.0 DYSURIA: ICD-10-CM

## 2024-02-12 LAB — BACTERIA UR CULT: NO GROWTH

## 2024-02-12 RX ORDER — DOXYCYCLINE 100 MG/1
CAPSULE ORAL
Qty: 14 CAPSULE | Refills: 0 | OUTPATIENT
Start: 2024-02-12

## 2024-02-14 LAB
C TRACH RRNA SPEC QL NAA+PROBE: POSITIVE
N GONORRHOEA RRNA SPEC QL NAA+PROBE: POSITIVE
T VAGINALIS RRNA SPEC QL NAA+PROBE: NEGATIVE

## 2024-03-11 ENCOUNTER — OFFICE VISIT (OUTPATIENT)
Age: 39
End: 2024-03-11

## 2024-03-11 VITALS
DIASTOLIC BLOOD PRESSURE: 93 MMHG | WEIGHT: 217 LBS | TEMPERATURE: 98.9 F | HEART RATE: 75 BPM | SYSTOLIC BLOOD PRESSURE: 135 MMHG | OXYGEN SATURATION: 94 % | RESPIRATION RATE: 19 BRPM | BODY MASS INDEX: 29.43 KG/M2

## 2024-03-11 DIAGNOSIS — B37.0 THRUSH OF MOUTH AND ESOPHAGUS (HCC): Primary | ICD-10-CM

## 2024-03-11 DIAGNOSIS — B37.81 THRUSH OF MOUTH AND ESOPHAGUS (HCC): Primary | ICD-10-CM

## 2024-03-11 RX ORDER — CLOTRIMAZOLE 10 MG/1
10 LOZENGE ORAL; TOPICAL 4 TIMES DAILY
Qty: 20 TABLET | Refills: 0 | Status: SHIPPED | OUTPATIENT
Start: 2024-03-11 | End: 2024-03-16

## 2024-03-11 RX ORDER — BUSPIRONE HYDROCHLORIDE 7.5 MG/1
7.5 TABLET ORAL
COMMUNITY
Start: 2024-02-12

## 2024-03-11 NOTE — PROGRESS NOTES
possible, Oral, 2 times daily Starting Mon 3/11/2024, Disp-100 mL, R-0, Normal       No results found for any visits on 03/11/24.  The patients condition was discussed with the patient and they understand.  The patient is to follow up with primary care doctor.  If signs and symptoms become worse the pt is to go to the ER. The patient is to take medications as prescribed.

## 2024-05-09 ENCOUNTER — OFFICE VISIT (OUTPATIENT)
Age: 39
End: 2024-05-09
Payer: MEDICAID

## 2024-05-09 VITALS
SYSTOLIC BLOOD PRESSURE: 134 MMHG | TEMPERATURE: 98.2 F | DIASTOLIC BLOOD PRESSURE: 84 MMHG | HEIGHT: 72 IN | BODY MASS INDEX: 29.66 KG/M2 | HEART RATE: 96 BPM | WEIGHT: 219 LBS | OXYGEN SATURATION: 95 % | RESPIRATION RATE: 18 BRPM

## 2024-05-09 DIAGNOSIS — M25.512 CHRONIC LEFT SHOULDER PAIN: Primary | ICD-10-CM

## 2024-05-09 DIAGNOSIS — Z01.89 PATIENT REQUESTED DIAGNOSTIC TESTING: ICD-10-CM

## 2024-05-09 DIAGNOSIS — A64 STD (MALE): ICD-10-CM

## 2024-05-09 DIAGNOSIS — R53.83 OTHER FATIGUE: ICD-10-CM

## 2024-05-09 DIAGNOSIS — G89.29 CHRONIC LEFT SHOULDER PAIN: Primary | ICD-10-CM

## 2024-05-09 PROCEDURE — 99214 OFFICE O/P EST MOD 30 MIN: CPT | Performed by: FAMILY MEDICINE

## 2024-05-09 RX ORDER — METHYLPREDNISOLONE 4 MG/1
TABLET ORAL
Qty: 21 TABLET | Refills: 0 | Status: SHIPPED | OUTPATIENT
Start: 2024-05-09

## 2024-05-09 RX ORDER — MELOXICAM 15 MG/1
15 TABLET ORAL DAILY
Qty: 30 TABLET | Refills: 0 | Status: SHIPPED | OUTPATIENT
Start: 2024-05-09

## 2024-05-09 ASSESSMENT — PATIENT HEALTH QUESTIONNAIRE - PHQ9
8. MOVING OR SPEAKING SO SLOWLY THAT OTHER PEOPLE COULD HAVE NOTICED. OR THE OPPOSITE, BEING SO FIGETY OR RESTLESS THAT YOU HAVE BEEN MOVING AROUND A LOT MORE THAN USUAL: NOT AT ALL
10. IF YOU CHECKED OFF ANY PROBLEMS, HOW DIFFICULT HAVE THESE PROBLEMS MADE IT FOR YOU TO DO YOUR WORK, TAKE CARE OF THINGS AT HOME, OR GET ALONG WITH OTHER PEOPLE: NOT DIFFICULT AT ALL
2. FEELING DOWN, DEPRESSED OR HOPELESS: NOT AT ALL
7. TROUBLE CONCENTRATING ON THINGS, SUCH AS READING THE NEWSPAPER OR WATCHING TELEVISION: NOT AT ALL
5. POOR APPETITE OR OVEREATING: NEARLY EVERY DAY
4. FEELING TIRED OR HAVING LITTLE ENERGY: NOT AT ALL
1. LITTLE INTEREST OR PLEASURE IN DOING THINGS: NOT AT ALL
SUM OF ALL RESPONSES TO PHQ QUESTIONS 1-9: 6
9. THOUGHTS THAT YOU WOULD BE BETTER OFF DEAD, OR OF HURTING YOURSELF: NOT AT ALL
SUM OF ALL RESPONSES TO PHQ QUESTIONS 1-9: 6
SUM OF ALL RESPONSES TO PHQ9 QUESTIONS 1 & 2: 0
3. TROUBLE FALLING OR STAYING ASLEEP: NEARLY EVERY DAY
6. FEELING BAD ABOUT YOURSELF - OR THAT YOU ARE A FAILURE OR HAVE LET YOURSELF OR YOUR FAMILY DOWN: NOT AT ALL

## 2024-05-09 NOTE — PROGRESS NOTES
No chief complaint on file.      \"Have you been to the ER, urgent care clinic since your last visit?  Hospitalized since your last visit?\"    NO    “Have you seen or consulted any other health care providers outside of [facility] since your last visit?”    NO            Vitals:    24 1540   BP: 134/84   Pulse: 96   Resp: 18   Temp: 98.2 °F (36.8 °C)   SpO2: 95%   Weight: 99.3 kg (219 lb)   Height: 1.829 m (6')      Health Maintenance Due   Topic Date Due    Hepatitis B vaccine (1 of 3 - 3-dose series) Never done    Varicella vaccine (1 of 2 - 2-dose childhood series) Never done    Pneumococcal 0-64 years Vaccine (1 of 2 - PCV) Never done    DTaP/Tdap/Td vaccine (1 - Tdap) Never done    COVID-19 Vaccine (3 -  season) 2023    Depression Monitoring  2024      The patient, John Fonseca, identity was verified by name and .

## 2024-05-09 NOTE — PROGRESS NOTES
John Fonseca (:  1985) is a 38 y.o. male,Established patient, here for evaluation of the following chief complaint(s):  Diabetes (A1C and Cholesterol levels) and Back Pain    Shoulder Pain   The history is provided by the patient. This is a chronic problem.  patient has a lot of difficulty with overhead activity, raising arm is very painful and the pain  awakens the patient at night,   The quality of the pain is described as dull. The pain is at a severity of 8/10. Associated symptoms include limited range of motion. Pertinent negatives include no numbness, ++stiffness, no tingling, no itching, no back pain and + neck pain.      Specific concerns today: std check pt with hx of unsafe sex denies discharge having no rash no sore throat not using condoms that much, unknown std expo wants to get all checked has no fever, Was treated    Patient presents stating that has been feeling tired and fatigue does not go away has been feeling like this for a while,  W/ decreased libido,  Currently on no oral multivitamin daily,  at this time denies any other complaint,         Constitutional: no chills and fever,nad     HENT: no ear pain and nosebleeds. No blurred vision,   Respiratory: no shortness of breath, wheezing cough nor sore throat.    Cardiovascular: Has no chest pain, leg swelling ,and racing heart .   Gastrointestinal: No constipation, diarrhea, nausea and vomiting.   Genitourinary: No frequency.   Musculoskeletal: +++for multiple joint pain.   Skin: no itching, pimples   Neurological: Negative for dizziness, no tremors  Psychiatric/Behavioral: Negative for depression,  not nervous/anxious.      General: Patient alert cooperative   HEENT; normocephalic and atraumatic     Neck: supple no adenopathy no JVD no bruit  Lungs: Clear to auscultation bilaterally no rales rhonchi or wheezes  Heart: Normal regular S1-S2 s no murmur  Breast exam deferred  Abdomen: Soft nontender normal bowel sounds

## 2024-05-10 LAB
HBV SURFACE AB SER QL: REACTIVE
HCV IGG SERPL QL IA: NON REACTIVE

## 2024-05-11 LAB
CHOLEST SERPL-MCNC: 240 MG/DL (ref 100–199)
HDLC SERPL-MCNC: 70 MG/DL
HIV 1+2 AB+HIV1 P24 AG SERPL QL IA: NON REACTIVE
LDLC SERPL CALC-MCNC: 163 MG/DL (ref 0–99)
TRIGL SERPL-MCNC: 43 MG/DL (ref 0–149)
VLDLC SERPL CALC-MCNC: 7 MG/DL (ref 5–40)

## 2024-06-21 DIAGNOSIS — G89.29 CHRONIC LEFT SHOULDER PAIN: ICD-10-CM

## 2024-06-21 DIAGNOSIS — M25.512 CHRONIC LEFT SHOULDER PAIN: ICD-10-CM

## 2024-06-21 NOTE — TELEPHONE ENCOUNTER
Last appointment: 5/9/24  Next appointment: Advised to follow-up 8/9/24  Previous refill encounter(s): 5/9/24 #30    Requested Prescriptions     Pending Prescriptions Disp Refills    meloxicam (MOBIC) 15 MG tablet [Pharmacy Med Name: MELOXICAM 15 MG TABLET] 30 tablet 0     Sig: TAKE 1 TABLET BY MOUTH EVERY DAY         For Pharmacy Admin Tracking Only    Program: Medication Refill  CPA in place:    Recommendation Provided To:   Intervention Detail: New Rx: 1, reason: Patient Preference  Intervention Accepted By:   Gap Closed?:    Time Spent (min): 5

## 2024-06-26 RX ORDER — MELOXICAM 15 MG/1
15 TABLET ORAL DAILY
Qty: 30 TABLET | Refills: 0 | Status: SHIPPED | OUTPATIENT
Start: 2024-06-26

## 2024-11-04 ENCOUNTER — TELEPHONE (OUTPATIENT)
Age: 39
End: 2024-11-04

## 2024-11-04 NOTE — TELEPHONE ENCOUNTER
Patient states he has not been feeling well for a while, he has been having headaches/ diarrhea, now he has been having constipation.  Please give him a call @ [phone]

## 2024-11-05 ENCOUNTER — TELEMEDICINE (OUTPATIENT)
Age: 39
End: 2024-11-05
Payer: MEDICAID

## 2024-11-05 DIAGNOSIS — J02.9 ACUTE PHARYNGITIS, UNSPECIFIED ETIOLOGY: Primary | ICD-10-CM

## 2024-11-05 DIAGNOSIS — K52.9 CHRONIC DIARRHEA: ICD-10-CM

## 2024-11-05 PROCEDURE — 99213 OFFICE O/P EST LOW 20 MIN: CPT | Performed by: FAMILY MEDICINE

## 2024-11-05 RX ORDER — BENZONATATE 200 MG/1
200 CAPSULE ORAL 2 TIMES DAILY PRN
Qty: 14 CAPSULE | Refills: 0 | Status: SHIPPED | OUTPATIENT
Start: 2024-11-05 | End: 2024-11-12

## 2024-11-05 NOTE — PROGRESS NOTES
John Fonseca, was evaluated through a synchronous (real-time) audio-video encounter. The patient (or guardian if applicable) is aware that this is a billable service, which includes applicable co-pays. This Virtual Visit was conducted with patient's (and/or legal guardian's) consent. Patient identification was verified, and a caregiver was present when appropriate.   The patient was located at Home: [address] [place] [id number]  Provider was located at Facility (Appt Dept): [address] [place] [phone]  Confirm you are appropriately licensed, registered, or certified to deliver care in the state where the patient is located as indicated above. If you are not or unsure, please re-schedule the visit: Yes, I confirm.     John Fonseca (:  1985) is a Established patient, presenting virtually for evaluation of the following:  Started >6 days ago not better,  with diarrhea not water none bloody but soft with daily alcohol intake, stating that otc not helping, have a very bad Sore throat, without a lot of painful Cough , no sneezing no nasal discharge but has sinus pain and a foul order feeling warm, no history of asthma no history of COPD not sleeping well that appetite is down and has some earache, having no diarrhea patient  has had sick exposure currently does not smoke,          Constitutional: no chills and fever,  , nad     HENT: no ear pain or nosebleeds. No blurred vision  Respiratory: noshortness of breath,no wheezing no cough ++sore thraot  Cardiovascular: Has no chest pain, ,and racing heart .   Gastrointestinal: No constipation, ++diarrhea, no nausea and vomiting.   Genitourinary: No frequency.   Musculoskeletal: Negative for joint pain.   Skin: no itching, no rash.   Neurological: Negative for dizziness, no tremors  Psychiatric/Behavioral: Negative for depression   is not nervous/anxious.   and not depressed the patient is not nervous/anxious.        Below

## 2024-11-05 NOTE — PROGRESS NOTES
Chief Complaint   Patient presents with    Diarrhea       \"Have you been to the ER, urgent care clinic since your last visit?  Hospitalized since your last visit?\"    NO    “Have you seen or consulted any other health care providers outside of [facility] since your last visit?”    NO            Click Here for Release of Records Request       The patient, John Fonseca, identity was verified by name and .

## 2024-12-11 ENCOUNTER — OFFICE VISIT (OUTPATIENT)
Age: 39
End: 2024-12-11
Payer: MEDICAID

## 2024-12-11 VITALS
TEMPERATURE: 97.4 F | DIASTOLIC BLOOD PRESSURE: 89 MMHG | HEART RATE: 90 BPM | BODY MASS INDEX: 28.89 KG/M2 | WEIGHT: 213 LBS | RESPIRATION RATE: 17 BRPM | SYSTOLIC BLOOD PRESSURE: 139 MMHG | OXYGEN SATURATION: 94 %

## 2024-12-11 DIAGNOSIS — R73.09 ABNORMAL GLUCOSE: ICD-10-CM

## 2024-12-11 DIAGNOSIS — M54.42 CHRONIC BILATERAL LOW BACK PAIN WITH LEFT-SIDED SCIATICA: ICD-10-CM

## 2024-12-11 DIAGNOSIS — Z00.00 ENCOUNTER FOR WELL ADULT EXAM WITHOUT ABNORMAL FINDINGS: Primary | ICD-10-CM

## 2024-12-11 DIAGNOSIS — E78.2 MIXED HYPERLIPIDEMIA: ICD-10-CM

## 2024-12-11 DIAGNOSIS — G89.29 CHRONIC BILATERAL LOW BACK PAIN WITH LEFT-SIDED SCIATICA: ICD-10-CM

## 2024-12-11 PROCEDURE — 99395 PREV VISIT EST AGE 18-39: CPT | Performed by: FAMILY MEDICINE

## 2024-12-11 RX ORDER — DICLOFENAC SODIUM 75 MG/1
75 TABLET, DELAYED RELEASE ORAL 2 TIMES DAILY PRN
Qty: 18 TABLET | Refills: 0 | Status: SHIPPED | OUTPATIENT
Start: 2024-12-11 | End: 2024-12-20

## 2024-12-11 RX ORDER — HYDROXYZINE HYDROCHLORIDE 50 MG/1
50 TABLET, FILM COATED ORAL 2 TIMES DAILY PRN
COMMUNITY
Start: 2024-11-03

## 2024-12-11 ASSESSMENT — PATIENT HEALTH QUESTIONNAIRE - PHQ9
SUM OF ALL RESPONSES TO PHQ QUESTIONS 1-9: 0
7. TROUBLE CONCENTRATING ON THINGS, SUCH AS READING THE NEWSPAPER OR WATCHING TELEVISION: NOT AT ALL
2. FEELING DOWN, DEPRESSED OR HOPELESS: NOT AT ALL
1. LITTLE INTEREST OR PLEASURE IN DOING THINGS: NOT AT ALL
5. POOR APPETITE OR OVEREATING: NOT AT ALL
8. MOVING OR SPEAKING SO SLOWLY THAT OTHER PEOPLE COULD HAVE NOTICED. OR THE OPPOSITE, BEING SO FIGETY OR RESTLESS THAT YOU HAVE BEEN MOVING AROUND A LOT MORE THAN USUAL: NOT AT ALL
SUM OF ALL RESPONSES TO PHQ9 QUESTIONS 1 & 2: 0
SUM OF ALL RESPONSES TO PHQ QUESTIONS 1-9: 0
10. IF YOU CHECKED OFF ANY PROBLEMS, HOW DIFFICULT HAVE THESE PROBLEMS MADE IT FOR YOU TO DO YOUR WORK, TAKE CARE OF THINGS AT HOME, OR GET ALONG WITH OTHER PEOPLE: NOT DIFFICULT AT ALL
SUM OF ALL RESPONSES TO PHQ QUESTIONS 1-9: 0
4. FEELING TIRED OR HAVING LITTLE ENERGY: NOT AT ALL
9. THOUGHTS THAT YOU WOULD BE BETTER OFF DEAD, OR OF HURTING YOURSELF: NOT AT ALL
6. FEELING BAD ABOUT YOURSELF - OR THAT YOU ARE A FAILURE OR HAVE LET YOURSELF OR YOUR FAMILY DOWN: NOT AT ALL
3. TROUBLE FALLING OR STAYING ASLEEP: NOT AT ALL
SUM OF ALL RESPONSES TO PHQ QUESTIONS 1-9: 0

## 2024-12-11 NOTE — PROGRESS NOTES
Chief Complaint   Patient presents with    Lower Back Pain    Annual Exam       \"Have you been to the ER, urgent care clinic since your last visit?  Hospitalized since your last visit?\"    NO    “Have you seen or consulted any other health care providers outside of [facility] since your last visit?”    NO            Click Here for Release of Records Request     No results found for this visit on 24.   Vitals:    24 1358   BP: 139/89   Pulse: 90   Resp: 17   Temp: 97.4 °F (36.3 °C)   TempSrc: Infrared   SpO2: 94%   Weight: 96.6 kg (213 lb)          The patient, John Fonseca, identity was verified by name and .   
  butalbital-acetaminophen-caffeine (FIORICET, ESGIC) -40 MG per tablet Take 1 tablet by mouth every 6 hours as needed  Patient not taking: Reported on 11/14/2023  Automatic Reconciliation, Ar     Past Medical History:   Diagnosis Date    Anxiety     Anxiety     Depression     Hypercholesterolemia      No past surgical history on file.  Family History   Problem Relation Age of Onset    Hypertension Mother     No Known Problems Father      Social History     Tobacco Use    Smoking status: Every Day     Current packs/day: 1.00     Types: Cigarettes    Smokeless tobacco: Never   Vaping Use    Vaping status: Never Used   Substance Use Topics    Alcohol use: Yes     Comment: soc    Drug use: Not Currently           Objective    Vital Signs  /89   Pulse 90   Temp 97.4 °F (36.3 °C) (Infrared)   Resp 17   Wt 96.6 kg (213 lb)   SpO2 94%   BMI 28.89 kg/m²     Wt Readings from Last 3 Encounters:   12/11/24 96.6 kg (213 lb)   05/09/24 99.3 kg (219 lb)   03/11/24 98.4 kg (217 lb)       Physical Exam     Constitutional: Well developed, well nourished.  non-toxic in appearance, not diaphoretic.   HEENT: PERRL. EOMI. The left TM is unremarkable. The right TM is unremarkable. No nasal  erythema noted.  THROAT: Posterior pharynx has no erythema, no exudates.    Neck:  no cervical lymphadenopathy. Neck is supple   Cardiovascular: Regular rate and rhythm, no murmurs, rubs, or gallops.   Pulmonary: Clear to auscultation bilaterally. Has no wheezing, rales or rhonchi.,  speaking in full sentences, has no accessory muscle used.  Abdomen: Bowel sounds are normal. Having no distension, no palpable mass. Soft,  No tenderness, rebound or guarding.   Musculoskeletal: No peripheral edema, having normal ROM  Skin:   warm and dry. No diaphoresis, rashes, or lesions.   Neurological: Alert, awake,  oriented x3 ,  normal speech.        Personalized Preventive Plan  Current Health Maintenance Status  Immunization History

## 2024-12-12 LAB
ALBUMIN SERPL-MCNC: 4.5 G/DL (ref 4.1–5.1)
ALP SERPL-CCNC: 94 IU/L (ref 44–121)
ALT SERPL-CCNC: 20 IU/L (ref 0–44)
AST SERPL-CCNC: 20 IU/L (ref 0–40)
BASOPHILS # BLD AUTO: 0 X10E3/UL (ref 0–0.2)
BASOPHILS NFR BLD AUTO: 1 %
BILIRUB SERPL-MCNC: 0.3 MG/DL (ref 0–1.2)
BUN SERPL-MCNC: 11 MG/DL (ref 6–20)
BUN/CREAT SERPL: 9 (ref 9–20)
CALCIUM SERPL-MCNC: 9.7 MG/DL (ref 8.7–10.2)
CHLORIDE SERPL-SCNC: 104 MMOL/L (ref 96–106)
CHOLEST SERPL-MCNC: 257 MG/DL (ref 100–199)
CO2 SERPL-SCNC: 25 MMOL/L (ref 20–29)
CREAT SERPL-MCNC: 1.17 MG/DL (ref 0.76–1.27)
EGFRCR SERPLBLD CKD-EPI 2021: 81 ML/MIN/1.73
EOSINOPHIL # BLD AUTO: 0.2 X10E3/UL (ref 0–0.4)
EOSINOPHIL NFR BLD AUTO: 3 %
ERYTHROCYTE [DISTWIDTH] IN BLOOD BY AUTOMATED COUNT: 13.6 % (ref 11.6–15.4)
GLOBULIN SER CALC-MCNC: 2.8 G/DL (ref 1.5–4.5)
GLUCOSE SERPL-MCNC: 113 MG/DL (ref 70–99)
HBA1C MFR BLD: 5.8 % (ref 4.8–5.6)
HCT VFR BLD AUTO: 42.9 % (ref 37.5–51)
HDLC SERPL-MCNC: 93 MG/DL
HGB BLD-MCNC: 14 G/DL (ref 13–17.7)
IMM GRANULOCYTES # BLD AUTO: 0 X10E3/UL (ref 0–0.1)
IMM GRANULOCYTES NFR BLD AUTO: 0 %
LDLC SERPL CALC-MCNC: 157 MG/DL (ref 0–99)
LYMPHOCYTES # BLD AUTO: 1.7 X10E3/UL (ref 0.7–3.1)
LYMPHOCYTES NFR BLD AUTO: 32 %
MCH RBC QN AUTO: 29.2 PG (ref 26.6–33)
MCHC RBC AUTO-ENTMCNC: 32.6 G/DL (ref 31.5–35.7)
MCV RBC AUTO: 89 FL (ref 79–97)
MONOCYTES # BLD AUTO: 0.4 X10E3/UL (ref 0.1–0.9)
MONOCYTES NFR BLD AUTO: 8 %
NEUTROPHILS # BLD AUTO: 2.9 X10E3/UL (ref 1.4–7)
NEUTROPHILS NFR BLD AUTO: 56 %
PLATELET # BLD AUTO: 356 X10E3/UL (ref 150–450)
POTASSIUM SERPL-SCNC: 5 MMOL/L (ref 3.5–5.2)
PROT SERPL-MCNC: 7.3 G/DL (ref 6–8.5)
RBC # BLD AUTO: 4.8 X10E6/UL (ref 4.14–5.8)
SODIUM SERPL-SCNC: 143 MMOL/L (ref 134–144)
T4 FREE SERPL-MCNC: 1.25 NG/DL (ref 0.82–1.77)
TRIGL SERPL-MCNC: 48 MG/DL (ref 0–149)
TSH SERPL DL<=0.005 MIU/L-ACNC: 1.12 UIU/ML (ref 0.45–4.5)
VLDLC SERPL CALC-MCNC: 7 MG/DL (ref 5–40)
WBC # BLD AUTO: 5.3 X10E3/UL (ref 3.4–10.8)

## 2025-02-01 DIAGNOSIS — G89.29 CHRONIC LEFT SHOULDER PAIN: ICD-10-CM

## 2025-02-01 DIAGNOSIS — M25.512 CHRONIC LEFT SHOULDER PAIN: ICD-10-CM

## 2025-02-03 RX ORDER — MELOXICAM 15 MG/1
15 TABLET ORAL DAILY
Qty: 30 TABLET | Refills: 0 | Status: SHIPPED | OUTPATIENT
Start: 2025-02-03

## 2025-03-04 DIAGNOSIS — G89.29 CHRONIC LEFT SHOULDER PAIN: ICD-10-CM

## 2025-03-04 DIAGNOSIS — M25.512 CHRONIC LEFT SHOULDER PAIN: ICD-10-CM

## 2025-03-05 NOTE — TELEPHONE ENCOUNTER
Last appointment: 12/11/24  Next appointment: Advised to follow-up 12/2025  Previous refill encounter(s): 2/3/25 #30    Requested Prescriptions     Pending Prescriptions Disp Refills    meloxicam (MOBIC) 15 MG tablet [Pharmacy Med Name: MELOXICAM 15 MG TABLET] 30 tablet 0     Sig: TAKE 1 TABLET BY MOUTH EVERY DAY         For Pharmacy Admin Tracking Only    Program: Medication Refill  CPA in place:    Recommendation Provided To:   Intervention Detail: New Rx: 1, reason: Patient Preference  Intervention Accepted By:   Gap Closed?:    Time Spent (min): 5

## 2025-03-06 RX ORDER — MELOXICAM 15 MG/1
15 TABLET ORAL DAILY
Qty: 30 TABLET | Refills: 0 | Status: SHIPPED | OUTPATIENT
Start: 2025-03-06